# Patient Record
Sex: FEMALE | Race: WHITE | NOT HISPANIC OR LATINO | Employment: FULL TIME | URBAN - METROPOLITAN AREA
[De-identification: names, ages, dates, MRNs, and addresses within clinical notes are randomized per-mention and may not be internally consistent; named-entity substitution may affect disease eponyms.]

---

## 2023-03-04 ENCOUNTER — DOCUMENTATION (OUTPATIENT)
Dept: PRIMARY CARE | Facility: CLINIC | Age: 43
End: 2023-03-04
Payer: COMMERCIAL

## 2023-03-04 DIAGNOSIS — I10 PRIMARY HYPERTENSION: Primary | ICD-10-CM

## 2023-03-04 RX ORDER — LOSARTAN POTASSIUM 50 MG/1
TABLET ORAL
COMMUNITY
End: 2023-12-07 | Stop reason: ALTCHOICE

## 2023-03-04 RX ORDER — AMLODIPINE BESYLATE 5 MG/1
5 TABLET ORAL DAILY
Qty: 30 TABLET | Refills: 0 | Status: SHIPPED | OUTPATIENT
Start: 2023-03-04 | End: 2024-03-27

## 2023-03-04 NOTE — PROGRESS NOTES
Patient at work -- bp running high despite losartan 100 mg daily -- 180/110.   HA but no vision change, no cp/sob.   Add norvasc 5 mg daily and follow up either with PCP or re-establish with me in [place]. [facility]

## 2023-03-23 LAB
BASOPHILS (10*3/UL) IN BLOOD BY AUTOMATED COUNT: 0.07 X10E9/L (ref 0–0.1)
BASOPHILS/100 LEUKOCYTES IN BLOOD BY AUTOMATED COUNT: 0.7 % (ref 0–2)
EOSINOPHILS (10*3/UL) IN BLOOD BY AUTOMATED COUNT: 0.36 X10E9/L (ref 0–0.7)
EOSINOPHILS/100 LEUKOCYTES IN BLOOD BY AUTOMATED COUNT: 3.8 % (ref 0–6)
ERYTHROCYTE DISTRIBUTION WIDTH (RATIO) BY AUTOMATED COUNT: 12.9 % (ref 11.5–14.5)
ERYTHROCYTE MEAN CORPUSCULAR HEMOGLOBIN CONCENTRATION (G/DL) BY AUTOMATED: 34 G/DL (ref 32–36)
ERYTHROCYTE MEAN CORPUSCULAR VOLUME (FL) BY AUTOMATED COUNT: 95 FL (ref 80–100)
ERYTHROCYTES (10*6/UL) IN BLOOD BY AUTOMATED COUNT: 4.78 X10E12/L (ref 4–5.2)
HEMATOCRIT (%) IN BLOOD BY AUTOMATED COUNT: 45.3 % (ref 36–46)
HEMOGLOBIN (G/DL) IN BLOOD: 15.4 G/DL (ref 12–16)
IGA (MG/DL) IN SER/PLAS: 280 MG/DL (ref 70–400)
IGE (IU/L) IN SERUM OR PLASMA: 32 IU/ML (ref 0–214)
IGG (MG/DL) IN SER/PLAS: 1070 MG/DL (ref 700–1600)
IGM (MG/DL) IN SER/PLAS: 68 MG/DL (ref 40–230)
IMMATURE GRANULOCYTES/100 LEUKOCYTES IN BLOOD BY AUTOMATED COUNT: 0.3 % (ref 0–0.9)
LEUKOCYTES (10*3/UL) IN BLOOD BY AUTOMATED COUNT: 9.5 X10E9/L (ref 4.4–11.3)
LYMPHOCYTES (10*3/UL) IN BLOOD BY AUTOMATED COUNT: 3.03 X10E9/L (ref 1.2–4.8)
LYMPHOCYTES/100 LEUKOCYTES IN BLOOD BY AUTOMATED COUNT: 31.8 % (ref 13–44)
MONOCYTES (10*3/UL) IN BLOOD BY AUTOMATED COUNT: 0.89 X10E9/L (ref 0.1–1)
MONOCYTES/100 LEUKOCYTES IN BLOOD BY AUTOMATED COUNT: 9.3 % (ref 2–10)
NEUTROPHILS (10*3/UL) IN BLOOD BY AUTOMATED COUNT: 5.15 X10E9/L (ref 1.2–7.7)
NEUTROPHILS/100 LEUKOCYTES IN BLOOD BY AUTOMATED COUNT: 54.1 % (ref 40–80)
PLATELETS (10*3/UL) IN BLOOD AUTOMATED COUNT: 291 X10E9/L (ref 150–450)

## 2023-03-27 LAB
PNEUMO SEROTYPE INTERPRETATION: NORMAL
SEROTYPE 1, VIRC: 0.52 UG/ML
SEROTYPE 12F, VIRC: 0.29 UG/ML
SEROTYPE 14, VIRC: 34.69 UG/ML
SEROTYPE 18C(56), VIRC: 4.54 UG/ML
SEROTYPE 19F, VIRC: 16.78 UG/ML
SEROTYPE 23F, VIRC: 1.03 UG/ML
SEROTYPE 3, VIRC: 8.08 UG/ML
SEROTYPE 4, VIRC: 0.68 UG/ML
SEROTYPE 5, VIRC: 1.08 UG/ML
SEROTYPE 6B(26), VIRC: 3.07 UG/ML
SEROTYPE 7F(51), VIRC: 8.62 UG/ML
SEROTYPE 8, VIRC: 1.77 UG/ML
SEROTYPE 9N, VIRC: 0.19 UG/ML
SEROTYPE 9V(68), VIRC: 0.21 UG/ML

## 2023-03-30 LAB — MANNAN BINDING LECTIN: <40 NG/ML

## 2023-06-20 ENCOUNTER — APPOINTMENT (OUTPATIENT)
Dept: LAB | Facility: LAB | Age: 43
End: 2023-06-20
Payer: COMMERCIAL

## 2023-06-23 LAB
PNEUMO SEROTYPE INTERPRETATION: NORMAL
SEROTYPE 1, VIRC: 11.39 UG/ML
SEROTYPE 12F, VIRC: 4.56 UG/ML
SEROTYPE 14, VIRC: >52.09 UG/ML
SEROTYPE 18C(56), VIRC: 6.27 UG/ML
SEROTYPE 19F, VIRC: 20.7 UG/ML
SEROTYPE 23F, VIRC: 3.52 UG/ML
SEROTYPE 3, VIRC: >19.53 UG/ML
SEROTYPE 4, VIRC: 2.86 UG/ML
SEROTYPE 5, VIRC: 4.56 UG/ML
SEROTYPE 6B(26), VIRC: 15.09 UG/ML
SEROTYPE 7F(51), VIRC: 15.89 UG/ML
SEROTYPE 8, VIRC: >22.85 UG/ML
SEROTYPE 9N, VIRC: 1.04 UG/ML
SEROTYPE 9V(68), VIRC: 1.9 UG/ML

## 2023-07-06 ENCOUNTER — HOSPITAL ENCOUNTER (OUTPATIENT)
Dept: DATA CONVERSION | Facility: HOSPITAL | Age: 43
End: 2023-07-06
Payer: COMMERCIAL

## 2023-07-06 DIAGNOSIS — M85.80 OTHER SPECIFIED DISORDERS OF BONE DENSITY AND STRUCTURE, UNSPECIFIED SITE: ICD-10-CM

## 2023-07-06 DIAGNOSIS — Z88.2 ALLERGY STATUS TO SULFONAMIDES: ICD-10-CM

## 2023-07-06 DIAGNOSIS — F17.200 NICOTINE DEPENDENCE, UNSPECIFIED, UNCOMPLICATED: ICD-10-CM

## 2023-07-06 DIAGNOSIS — G47.33 OBSTRUCTIVE SLEEP APNEA (ADULT) (PEDIATRIC): ICD-10-CM

## 2023-07-06 DIAGNOSIS — K21.9 GASTRO-ESOPHAGEAL REFLUX DISEASE WITHOUT ESOPHAGITIS: ICD-10-CM

## 2023-07-06 DIAGNOSIS — J32.0 CHRONIC MAXILLARY SINUSITIS: ICD-10-CM

## 2023-07-06 DIAGNOSIS — E66.9 OBESITY, UNSPECIFIED: ICD-10-CM

## 2023-07-06 DIAGNOSIS — G43.909 MIGRAINE, UNSPECIFIED, NOT INTRACTABLE, WITHOUT STATUS MIGRAINOSUS: ICD-10-CM

## 2023-07-06 DIAGNOSIS — J32.2 CHRONIC ETHMOIDAL SINUSITIS: ICD-10-CM

## 2023-09-13 PROBLEM — E55.9 VITAMIN D DEFICIENCY: Status: ACTIVE | Noted: 2023-09-13

## 2023-09-13 PROBLEM — N92.0 MENORRHAGIA WITH REGULAR CYCLE: Status: ACTIVE | Noted: 2023-09-13

## 2023-09-13 PROBLEM — J30.9 ALLERGIC RHINITIS DUE TO ALLERGEN: Status: ACTIVE | Noted: 2023-09-13

## 2023-09-13 PROBLEM — J45.20 MILD INTERMITTENT ASTHMA (HHS-HCC): Status: ACTIVE | Noted: 2023-09-13

## 2023-09-13 PROBLEM — K21.9 GASTROESOPHAGEAL REFLUX DISEASE WITHOUT ESOPHAGITIS: Status: ACTIVE | Noted: 2023-09-13

## 2023-09-13 PROBLEM — J32.2 CHRONIC ETHMOIDAL SINUSITIS: Status: ACTIVE | Noted: 2023-09-13

## 2023-09-13 PROBLEM — H69.93 DYSFUNCTION OF BOTH EUSTACHIAN TUBES: Status: ACTIVE | Noted: 2023-09-13

## 2023-09-13 PROBLEM — J32.0 CHRONIC MAXILLARY SINUSITIS: Status: ACTIVE | Noted: 2023-09-13

## 2023-09-13 PROBLEM — J30.89 ALLERGIC RHINITIS DUE TO DUST MITE: Status: ACTIVE | Noted: 2023-09-13

## 2023-09-13 PROBLEM — N76.6 VULVAR ULCER: Status: ACTIVE | Noted: 2023-09-13

## 2023-09-13 PROBLEM — F41.1 GENERALIZED ANXIETY DISORDER: Status: ACTIVE | Noted: 2023-09-13

## 2023-09-13 PROBLEM — Q17.8 EUSTACHIAN TUBE ANOMALY: Status: ACTIVE | Noted: 2023-09-13

## 2023-09-13 PROBLEM — K76.0 FATTY INFILTRATION OF LIVER: Status: ACTIVE | Noted: 2023-09-13

## 2023-09-13 PROBLEM — R09.81 NASAL CONGESTION WITH RHINORRHEA: Status: ACTIVE | Noted: 2023-09-13

## 2023-09-13 PROBLEM — M26.629 TEMPOROMANDIBULAR JOINT PAIN DYSFUNCTION SYNDROME: Status: ACTIVE | Noted: 2023-09-13

## 2023-09-13 PROBLEM — B37.31 VAGINAL CANDIDIASIS: Status: ACTIVE | Noted: 2023-09-13

## 2023-09-13 PROBLEM — J30.1 NON-SEASONAL ALLERGIC RHINITIS DUE TO POLLEN: Status: ACTIVE | Noted: 2023-09-13

## 2023-09-13 PROBLEM — F51.01 PRIMARY INSOMNIA: Status: ACTIVE | Noted: 2023-09-13

## 2023-09-13 PROBLEM — N93.8 DUB (DYSFUNCTIONAL UTERINE BLEEDING): Status: ACTIVE | Noted: 2023-09-13

## 2023-09-13 PROBLEM — J32.4 CHRONIC PANSINUSITIS: Status: ACTIVE | Noted: 2023-09-13

## 2023-09-13 PROBLEM — J30.2 SEASONAL AND PERENNIAL ALLERGIC RHINOCONJUNCTIVITIS OF BOTH EYES: Status: ACTIVE | Noted: 2023-09-13

## 2023-09-13 PROBLEM — J34.3 HYPERTROPHY OF BOTH INFERIOR NASAL TURBINATES: Status: ACTIVE | Noted: 2023-09-13

## 2023-09-13 PROBLEM — L29.2 VULVAR ITCHING: Status: ACTIVE | Noted: 2023-09-13

## 2023-09-13 PROBLEM — D58.2 ELEVATED HEMOGLOBIN (CMS-HCC): Status: ACTIVE | Noted: 2023-09-13

## 2023-09-13 PROBLEM — N89.8 VAGINAL LESION: Status: ACTIVE | Noted: 2023-09-13

## 2023-09-13 PROBLEM — N63.20 BREAST MASS, LEFT: Status: ACTIVE | Noted: 2023-09-13

## 2023-09-13 PROBLEM — J30.9 ALLERGIC RHINOSINUSITIS: Status: ACTIVE | Noted: 2023-09-13

## 2023-09-13 PROBLEM — N89.8 VAGINAL DISCHARGE: Status: ACTIVE | Noted: 2023-09-13

## 2023-09-13 PROBLEM — G47.33 OSA (OBSTRUCTIVE SLEEP APNEA): Status: ACTIVE | Noted: 2023-09-13

## 2023-09-13 PROBLEM — J30.89 SEASONAL AND PERENNIAL ALLERGIC RHINOCONJUNCTIVITIS OF BOTH EYES: Status: ACTIVE | Noted: 2023-09-13

## 2023-09-13 PROBLEM — H10.13 SEASONAL AND PERENNIAL ALLERGIC RHINOCONJUNCTIVITIS OF BOTH EYES: Status: ACTIVE | Noted: 2023-09-13

## 2023-09-13 PROBLEM — B37.9 YEAST INFECTION: Status: ACTIVE | Noted: 2023-09-13

## 2023-09-13 PROBLEM — N90.89 VULVAR LESION: Status: ACTIVE | Noted: 2023-09-13

## 2023-09-13 PROBLEM — J34.89 NASAL CONGESTION WITH RHINORRHEA: Status: ACTIVE | Noted: 2023-09-13

## 2023-09-13 PROBLEM — D89.89 MANNOSE-BINDING LECTIN DEFICIENCY (MULTI): Status: ACTIVE | Noted: 2023-09-13

## 2023-09-13 PROBLEM — G43.009 MIGRAINE WITHOUT AURA, NOT REFRACTORY: Status: ACTIVE | Noted: 2023-09-13

## 2023-09-13 PROBLEM — R44.8 FACIAL PRESSURE: Status: ACTIVE | Noted: 2023-09-13

## 2023-09-13 RX ORDER — FAMOTIDINE 20 MG/1
1 TABLET, FILM COATED ORAL NIGHTLY PRN
COMMUNITY
Start: 2022-04-03 | End: 2024-02-05 | Stop reason: ALTCHOICE

## 2023-09-13 RX ORDER — ALBUTEROL SULFATE 90 UG/1
2 AEROSOL, METERED RESPIRATORY (INHALATION) 4 TIMES DAILY PRN
COMMUNITY
Start: 2021-05-25

## 2023-09-13 RX ORDER — ACETAMINOPHEN 500 MG
TABLET ORAL
COMMUNITY
End: 2023-09-13 | Stop reason: SDUPTHER

## 2023-09-13 RX ORDER — CALCIUM CARBONATE 1250 MG/5ML
SUSPENSION ORAL
COMMUNITY
End: 2023-12-07 | Stop reason: SDUPTHER

## 2023-09-13 RX ORDER — TRAZODONE HYDROCHLORIDE 50 MG/1
1 TABLET ORAL NIGHTLY PRN
COMMUNITY
Start: 2022-10-07 | End: 2023-12-07 | Stop reason: ALTCHOICE

## 2023-09-13 RX ORDER — CHOLECALCIFEROL (VITAMIN D3) 50 MCG
1 TABLET ORAL DAILY
COMMUNITY

## 2023-09-13 RX ORDER — VANCOMYCIN HCL 900 MCG/MG
POWDER (GRAM) MISCELLANEOUS DAILY
COMMUNITY
Start: 2022-09-08 | End: 2023-12-07 | Stop reason: ALTCHOICE

## 2023-09-13 RX ORDER — NADOLOL 40 MG/1
1 TABLET ORAL DAILY
COMMUNITY
Start: 2021-04-01 | End: 2023-12-07 | Stop reason: ALTCHOICE

## 2023-09-13 RX ORDER — RIZATRIPTAN BENZOATE 10 MG/1
1 TABLET, ORALLY DISINTEGRATING ORAL DAILY
COMMUNITY
Start: 2022-10-07 | End: 2023-12-07 | Stop reason: SDUPTHER

## 2023-09-13 RX ORDER — CYCLOBENZAPRINE HCL 10 MG
1 TABLET ORAL NIGHTLY
COMMUNITY
End: 2024-03-14 | Stop reason: ALTCHOICE

## 2023-09-13 RX ORDER — CHOLECALCIFEROL (VITAMIN D3) 125 MCG
CAPSULE ORAL
COMMUNITY
End: 2023-12-07 | Stop reason: ALTCHOICE

## 2023-09-13 RX ORDER — ZINC GLUCONATE 50 MG
1 TABLET ORAL DAILY
COMMUNITY

## 2023-09-13 RX ORDER — DOXYCYCLINE HYCLATE 100 MG
1 TABLET ORAL 2 TIMES DAILY
COMMUNITY
Start: 2023-06-23 | End: 2023-12-07 | Stop reason: ALTCHOICE

## 2023-09-13 RX ORDER — ESOMEPRAZOLE MAGNESIUM 40 MG/1
1 CAPSULE, DELAYED RELEASE ORAL 2 TIMES DAILY
COMMUNITY
Start: 2023-04-12 | End: 2023-07-11 | Stop reason: WASHOUT

## 2023-09-13 RX ORDER — TRIAMCINOLONE ACETONIDE OINTMENT USP, 0.05% 0.5 MG/G
OINTMENT TOPICAL 2 TIMES DAILY
COMMUNITY
Start: 2023-05-10

## 2023-09-13 RX ORDER — SOD CHLOR,BICARB/SQUEEZ BOTTLE
PACKET, WITH RINSE DEVICE NASAL
COMMUNITY
Start: 2021-10-28

## 2023-09-13 RX ORDER — PRAMIPEXOLE DIHYDROCHLORIDE 0.25 MG/1
2 TABLET ORAL DAILY
COMMUNITY
Start: 2022-03-24 | End: 2023-12-07 | Stop reason: ALTCHOICE

## 2023-09-13 RX ORDER — MONTELUKAST SODIUM 10 MG/1
1 TABLET ORAL DAILY
COMMUNITY
Start: 2018-03-01

## 2023-09-13 RX ORDER — SUCRALFATE 1 G/1
1 TABLET ORAL 3 TIMES DAILY
COMMUNITY
End: 2023-12-07 | Stop reason: ALTCHOICE

## 2023-09-13 RX ORDER — LOSARTAN POTASSIUM 100 MG/1
1 TABLET ORAL DAILY
COMMUNITY

## 2023-09-13 RX ORDER — CALCIUM CARBONATE 500(1250)
1 TABLET ORAL
COMMUNITY

## 2023-09-13 RX ORDER — HYDROGEN PEROXIDE 3 %
1 SOLUTION, NON-ORAL MISCELLANEOUS 2 TIMES DAILY
COMMUNITY

## 2023-09-13 RX ORDER — RIMEGEPANT SULFATE 75 MG/75MG
1 TABLET, ORALLY DISINTEGRATING ORAL EVERY OTHER DAY
COMMUNITY
Start: 2022-10-07 | End: 2023-12-07 | Stop reason: SDUPTHER

## 2023-09-13 RX ORDER — CITALOPRAM 20 MG/1
0.5 TABLET, FILM COATED ORAL
COMMUNITY
Start: 2021-04-07 | End: 2023-12-07 | Stop reason: ALTCHOICE

## 2023-09-13 RX ORDER — BUDESONIDE 1 MG/2ML
INHALANT ORAL
COMMUNITY
Start: 2022-04-09

## 2023-09-13 RX ORDER — CITALOPRAM 10 MG/1
1 TABLET ORAL DAILY
COMMUNITY
End: 2023-12-07 | Stop reason: ALTCHOICE

## 2023-09-13 RX ORDER — DICYCLOMINE HYDROCHLORIDE 10 MG/1
1 CAPSULE ORAL 4 TIMES DAILY PRN
COMMUNITY
Start: 2023-06-05 | End: 2023-12-07 | Stop reason: ALTCHOICE

## 2023-09-13 RX ORDER — LEVOCETIRIZINE DIHYDROCHLORIDE 5 MG/1
5 TABLET, FILM COATED ORAL EVERY EVENING
COMMUNITY
End: 2023-12-07 | Stop reason: ALTCHOICE

## 2023-09-13 RX ORDER — ASCORBIC ACID 500 MG
500 TABLET ORAL
COMMUNITY

## 2023-09-13 RX ORDER — ELETRIPTAN HYDROBROMIDE 40 MG/1
1 TABLET, FILM COATED ORAL AS NEEDED
COMMUNITY
Start: 2021-04-05 | End: 2023-12-07 | Stop reason: ALTCHOICE

## 2023-09-13 RX ORDER — OMEPRAZOLE 40 MG/1
1 CAPSULE, DELAYED RELEASE ORAL DAILY
COMMUNITY
Start: 2013-12-02 | End: 2023-12-07 | Stop reason: ALTCHOICE

## 2023-09-13 RX ORDER — MELOXICAM 15 MG/1
1 TABLET ORAL DAILY PRN
COMMUNITY
Start: 2022-11-03 | End: 2023-12-07 | Stop reason: ALTCHOICE

## 2023-09-13 RX ORDER — PNV NO.95/FERROUS FUM/FOLIC AC 28MG-0.8MG
TABLET ORAL
COMMUNITY

## 2023-09-13 RX ORDER — HYDROCHLOROTHIAZIDE 12.5 MG/1
1 TABLET ORAL DAILY
COMMUNITY
Start: 2023-01-06

## 2023-09-13 RX ORDER — CYCLOBENZAPRINE HCL 5 MG
1 TABLET ORAL NIGHTLY
COMMUNITY
Start: 2023-02-09 | End: 2023-12-07 | Stop reason: ALTCHOICE

## 2023-09-13 RX ORDER — CLOBETASOL PROPIONATE 0.5 MG/G
OINTMENT TOPICAL DAILY
COMMUNITY
End: 2023-12-07 | Stop reason: ALTCHOICE

## 2023-09-13 RX ORDER — TRIAMCINOLONE ACETONIDE 55 UG/1
2 SPRAY, METERED NASAL DAILY
COMMUNITY

## 2023-09-13 RX ORDER — CETIRIZINE HYDROCHLORIDE 10 MG/1
1 TABLET ORAL DAILY
COMMUNITY

## 2023-10-02 ENCOUNTER — TELEPHONE (OUTPATIENT)
Dept: PRIMARY CARE | Facility: CLINIC | Age: 43
End: 2023-10-02
Payer: COMMERCIAL

## 2023-10-02 DIAGNOSIS — F41.1 GENERALIZED ANXIETY DISORDER: ICD-10-CM

## 2023-10-02 DIAGNOSIS — G43.009 MIGRAINE WITHOUT AURA, NOT INTRACTABLE, WITHOUT STATUS MIGRAINOSUS: ICD-10-CM

## 2023-10-02 RX ORDER — PROPRANOLOL HYDROCHLORIDE 60 MG/1
CAPSULE, EXTENDED RELEASE ORAL
Qty: 30 CAPSULE | Refills: 2 | Status: SHIPPED | OUTPATIENT
Start: 2023-10-02 | End: 2023-12-07 | Stop reason: SINTOL

## 2023-10-02 RX ORDER — SERTRALINE HYDROCHLORIDE 50 MG/1
50 TABLET, FILM COATED ORAL DAILY
Qty: 30 TABLET | Refills: 2 | Status: SHIPPED | OUTPATIENT
Start: 2023-10-02 | End: 2023-12-07 | Stop reason: DRUGHIGH

## 2023-10-02 NOTE — OP NOTE
PROCEDURE DETAILS    Preoperative Diagnosis:  Obstructive sleep apnea, adult, G47.33    Postoperative Diagnosis:  Obstructive Sleep Apnea  Surgeon: Lurdes Bedoya  Resident/Fellow/Other Assistant: None of these were associated with this case    Procedure:  1.  Drug induced sleep endoscopy    Anesthesia: No anesthesiologist associated with this case  Estimated Blood Loss: 0  Findings: Collapsable Airway        Operative Report:   Patient was taken to [facility] operating room 4 by the anesthesia service, and remained in supine position. Oxymetazoline was used to decongest nasal mucosa.  Patient was sedated with Propofol for the sleep endoscopy.      Once effective Propofol sedation was achieved, patient slept comfortably, though was a bit restless throughout the sedation            A flexible endoscope was passed via the nares to evaluate sites of dynamic airway collapse.      STRUCTURE    OBSTRXN       A-P       LATERAL       CONCENTRIC      COMMENTS   VELUM                2                                                     2                         CCC     OROPHARYNX     1                                 1                                    TONGUE BASE    2               2        EPIGLOTTIS      0            The most significant finding was complete concentric collapse of the airway. Instability along the lateral pharyngeal walls and complete tongue collapse.    Patient's lowest oxygen desaturation was: 96%.    BIS= Lowest 63 and high 60s throughout exam     Patient tolerated exam well.  Blood loss: None                        Attestation:   Note Completion:  Attending Attestation I performed the procedure without a resident         Electronic Signatures:  Lurdes Bedoya)  (Signed 06-Jul-2023 09:43)   Authored: Post-Operative Note, Chart Review, Note Completion      Last Updated: 06-Jul-2023 09:43 by Lurdes Bedoya)

## 2023-10-02 NOTE — TELEPHONE ENCOUNTER
Patient called wanting to know if she can drop off paperwork from her allergy Dr. Dr Berman to see if your nurses can start giving her her allergy shots as it is closer for her to come here than to go to [place] where her allergist is. Please give her a call back at [phone]. PL

## 2023-10-03 NOTE — TELEPHONE ENCOUNTER
Patient was seen in September and patient was advised to go to Allergist for shots, offered a new referral for a closer allergist but patient refused and will go to the one she has been seeing.

## 2023-10-30 ENCOUNTER — TELEPHONE (OUTPATIENT)
Dept: PRIMARY CARE | Facility: CLINIC | Age: 43
End: 2023-10-30
Payer: COMMERCIAL

## 2023-11-27 ENCOUNTER — LAB (OUTPATIENT)
Dept: LAB | Facility: LAB | Age: 43
End: 2023-11-27
Payer: COMMERCIAL

## 2023-11-27 DIAGNOSIS — R73.09 OTHER ABNORMAL GLUCOSE: ICD-10-CM

## 2023-11-27 DIAGNOSIS — F41.1 GENERALIZED ANXIETY DISORDER: ICD-10-CM

## 2023-11-27 DIAGNOSIS — I10 ESSENTIAL (PRIMARY) HYPERTENSION: Primary | ICD-10-CM

## 2023-11-27 LAB
ALBUMIN SERPL BCP-MCNC: 4.5 G/DL (ref 3.4–5)
ALP SERPL-CCNC: 73 U/L (ref 33–110)
ALT SERPL W P-5'-P-CCNC: 28 U/L (ref 7–45)
ANION GAP SERPL CALC-SCNC: 14 MMOL/L (ref 10–20)
AST SERPL W P-5'-P-CCNC: 23 U/L (ref 9–39)
BASOPHILS # BLD AUTO: 0.09 X10*3/UL (ref 0–0.1)
BASOPHILS NFR BLD AUTO: 0.8 %
BILIRUB SERPL-MCNC: 1.2 MG/DL (ref 0–1.2)
BUN SERPL-MCNC: 13 MG/DL (ref 6–23)
CALCIUM SERPL-MCNC: 9.4 MG/DL (ref 8.6–10.3)
CHLORIDE SERPL-SCNC: 107 MMOL/L (ref 98–107)
CHOLEST SERPL-MCNC: 143 MG/DL (ref 0–199)
CHOLESTEROL/HDL RATIO: 4.6
CO2 SERPL-SCNC: 24 MMOL/L (ref 21–32)
CREAT SERPL-MCNC: 1.17 MG/DL (ref 0.5–1.05)
EOSINOPHIL # BLD AUTO: 0.18 X10*3/UL (ref 0–0.7)
EOSINOPHIL NFR BLD AUTO: 1.6 %
ERYTHROCYTE [DISTWIDTH] IN BLOOD BY AUTOMATED COUNT: 13.1 % (ref 11.5–14.5)
EST. AVERAGE GLUCOSE BLD GHB EST-MCNC: 114 MG/DL
GFR SERPL CREATININE-BSD FRML MDRD: 59 ML/MIN/1.73M*2
GLUCOSE SERPL-MCNC: 88 MG/DL (ref 74–99)
HBA1C MFR BLD: 5.6 %
HCT VFR BLD AUTO: 48.9 % (ref 36–46)
HDLC SERPL-MCNC: 31.3 MG/DL
HGB BLD-MCNC: 15.5 G/DL (ref 12–16)
IMM GRANULOCYTES # BLD AUTO: 0.03 X10*3/UL (ref 0–0.7)
IMM GRANULOCYTES NFR BLD AUTO: 0.3 % (ref 0–0.9)
LDLC SERPL CALC-MCNC: 52 MG/DL
LYMPHOCYTES # BLD AUTO: 2.97 X10*3/UL (ref 1.2–4.8)
LYMPHOCYTES NFR BLD AUTO: 26.9 %
MCH RBC QN AUTO: 31 PG (ref 26–34)
MCHC RBC AUTO-ENTMCNC: 31.7 G/DL (ref 32–36)
MCV RBC AUTO: 98 FL (ref 80–100)
MONOCYTES # BLD AUTO: 0.88 X10*3/UL (ref 0.1–1)
MONOCYTES NFR BLD AUTO: 8 %
NEUTROPHILS # BLD AUTO: 6.89 X10*3/UL (ref 1.2–7.7)
NEUTROPHILS NFR BLD AUTO: 62.4 %
NON HDL CHOLESTEROL: 112 MG/DL (ref 0–149)
NRBC BLD-RTO: 0 /100 WBCS (ref 0–0)
PLATELET # BLD AUTO: 341 X10*3/UL (ref 150–450)
POTASSIUM SERPL-SCNC: 4.3 MMOL/L (ref 3.5–5.3)
PROT SERPL-MCNC: 6.7 G/DL (ref 6.4–8.2)
RBC # BLD AUTO: 5 X10*6/UL (ref 4–5.2)
SODIUM SERPL-SCNC: 141 MMOL/L (ref 136–145)
TRIGL SERPL-MCNC: 301 MG/DL (ref 0–149)
TSH SERPL-ACNC: 2.71 MIU/L (ref 0.44–3.98)
VLDL: 60 MG/DL (ref 0–40)
WBC # BLD AUTO: 11 X10*3/UL (ref 4.4–11.3)

## 2023-11-27 PROCEDURE — 36415 COLL VENOUS BLD VENIPUNCTURE: CPT

## 2023-11-27 PROCEDURE — 80061 LIPID PANEL: CPT

## 2023-11-27 PROCEDURE — 83036 HEMOGLOBIN GLYCOSYLATED A1C: CPT

## 2023-11-27 PROCEDURE — 80053 COMPREHEN METABOLIC PANEL: CPT

## 2023-11-27 PROCEDURE — 84443 ASSAY THYROID STIM HORMONE: CPT

## 2023-11-27 PROCEDURE — 85025 COMPLETE CBC W/AUTO DIFF WBC: CPT

## 2023-12-06 ENCOUNTER — APPOINTMENT (OUTPATIENT)
Dept: OBSTETRICS AND GYNECOLOGY | Facility: CLINIC | Age: 43
End: 2023-12-06
Payer: COMMERCIAL

## 2023-12-07 ENCOUNTER — OFFICE VISIT (OUTPATIENT)
Dept: PRIMARY CARE | Facility: CLINIC | Age: 43
End: 2023-12-07
Payer: COMMERCIAL

## 2023-12-07 VITALS
WEIGHT: 221 LBS | HEART RATE: 89 BPM | HEIGHT: 67 IN | BODY MASS INDEX: 34.69 KG/M2 | OXYGEN SATURATION: 97 % | DIASTOLIC BLOOD PRESSURE: 86 MMHG | SYSTOLIC BLOOD PRESSURE: 124 MMHG

## 2023-12-07 DIAGNOSIS — I10 PRIMARY HYPERTENSION: ICD-10-CM

## 2023-12-07 DIAGNOSIS — K21.9 GASTROESOPHAGEAL REFLUX DISEASE WITHOUT ESOPHAGITIS: ICD-10-CM

## 2023-12-07 DIAGNOSIS — G43.009 MIGRAINE WITHOUT AURA, NOT REFRACTORY: Primary | ICD-10-CM

## 2023-12-07 DIAGNOSIS — F41.1 GENERALIZED ANXIETY DISORDER: ICD-10-CM

## 2023-12-07 PROBLEM — R44.8 FACIAL PRESSURE: Status: RESOLVED | Noted: 2023-09-13 | Resolved: 2023-12-07

## 2023-12-07 PROBLEM — D58.2 ELEVATED HEMOGLOBIN (CMS-HCC): Status: RESOLVED | Noted: 2023-09-13 | Resolved: 2023-12-07

## 2023-12-07 PROBLEM — J34.89 NASAL CONGESTION WITH RHINORRHEA: Status: RESOLVED | Noted: 2023-09-13 | Resolved: 2023-12-07

## 2023-12-07 PROBLEM — J30.1 NON-SEASONAL ALLERGIC RHINITIS DUE TO POLLEN: Status: RESOLVED | Noted: 2023-09-13 | Resolved: 2023-12-07

## 2023-12-07 PROBLEM — L29.2 VULVAR ITCHING: Status: RESOLVED | Noted: 2023-09-13 | Resolved: 2023-12-07

## 2023-12-07 PROBLEM — J32.2 CHRONIC ETHMOIDAL SINUSITIS: Status: RESOLVED | Noted: 2023-09-13 | Resolved: 2023-12-07

## 2023-12-07 PROBLEM — N92.0 MENORRHAGIA WITH REGULAR CYCLE: Status: RESOLVED | Noted: 2023-09-13 | Resolved: 2023-12-07

## 2023-12-07 PROBLEM — B37.9 YEAST INFECTION: Status: RESOLVED | Noted: 2023-09-13 | Resolved: 2023-12-07

## 2023-12-07 PROBLEM — J34.3 HYPERTROPHY OF BOTH INFERIOR NASAL TURBINATES: Status: RESOLVED | Noted: 2023-09-13 | Resolved: 2023-12-07

## 2023-12-07 PROBLEM — N89.8 VAGINAL LESION: Status: RESOLVED | Noted: 2023-09-13 | Resolved: 2023-12-07

## 2023-12-07 PROBLEM — N63.20 BREAST MASS, LEFT: Status: RESOLVED | Noted: 2023-09-13 | Resolved: 2023-12-07

## 2023-12-07 PROBLEM — R09.81 NASAL CONGESTION WITH RHINORRHEA: Status: RESOLVED | Noted: 2023-09-13 | Resolved: 2023-12-07

## 2023-12-07 PROBLEM — Q17.8 EUSTACHIAN TUBE ANOMALY: Status: RESOLVED | Noted: 2023-09-13 | Resolved: 2023-12-07

## 2023-12-07 PROBLEM — H69.93 DYSFUNCTION OF BOTH EUSTACHIAN TUBES: Status: RESOLVED | Noted: 2023-09-13 | Resolved: 2023-12-07

## 2023-12-07 PROBLEM — J32.0 CHRONIC MAXILLARY SINUSITIS: Status: RESOLVED | Noted: 2023-09-13 | Resolved: 2023-12-07

## 2023-12-07 PROBLEM — J30.9 ALLERGIC RHINITIS DUE TO ALLERGEN: Status: RESOLVED | Noted: 2023-09-13 | Resolved: 2023-12-07

## 2023-12-07 PROBLEM — N76.6 VULVAR ULCER: Status: RESOLVED | Noted: 2023-09-13 | Resolved: 2023-12-07

## 2023-12-07 PROBLEM — N89.8 VAGINAL DISCHARGE: Status: RESOLVED | Noted: 2023-09-13 | Resolved: 2023-12-07

## 2023-12-07 PROBLEM — J32.4 CHRONIC PANSINUSITIS: Status: RESOLVED | Noted: 2023-09-13 | Resolved: 2023-12-07

## 2023-12-07 PROBLEM — B37.31 VAGINAL CANDIDIASIS: Status: RESOLVED | Noted: 2023-09-13 | Resolved: 2023-12-07

## 2023-12-07 PROBLEM — J30.89 ALLERGIC RHINITIS DUE TO DUST MITE: Status: RESOLVED | Noted: 2023-09-13 | Resolved: 2023-12-07

## 2023-12-07 PROBLEM — N90.89 VULVAR LESION: Status: RESOLVED | Noted: 2023-09-13 | Resolved: 2023-12-07

## 2023-12-07 PROCEDURE — 3079F DIAST BP 80-89 MM HG: CPT | Performed by: FAMILY MEDICINE

## 2023-12-07 PROCEDURE — 3008F BODY MASS INDEX DOCD: CPT | Performed by: FAMILY MEDICINE

## 2023-12-07 PROCEDURE — 1036F TOBACCO NON-USER: CPT | Performed by: FAMILY MEDICINE

## 2023-12-07 PROCEDURE — 99214 OFFICE O/P EST MOD 30 MIN: CPT | Performed by: FAMILY MEDICINE

## 2023-12-07 PROCEDURE — 3074F SYST BP LT 130 MM HG: CPT | Performed by: FAMILY MEDICINE

## 2023-12-07 RX ORDER — RIZATRIPTAN BENZOATE 10 MG/1
10 TABLET, ORALLY DISINTEGRATING ORAL DAILY
Qty: 9 TABLET | Refills: 2 | Status: SHIPPED | OUTPATIENT
Start: 2023-12-07 | End: 2024-05-14

## 2023-12-07 RX ORDER — SERTRALINE HYDROCHLORIDE 50 MG/1
25 TABLET, FILM COATED ORAL DAILY
Qty: 30 TABLET | Refills: 2 | Status: SHIPPED | OUTPATIENT
Start: 2023-12-07 | End: 2023-12-07 | Stop reason: SDUPTHER

## 2023-12-07 RX ORDER — RIMEGEPANT SULFATE 75 MG/75MG
75 TABLET, ORALLY DISINTEGRATING ORAL EVERY OTHER DAY
Qty: 16 TABLET | Refills: 2 | Status: SHIPPED | OUTPATIENT
Start: 2023-12-07 | End: 2024-05-14

## 2023-12-07 RX ORDER — FAMOTIDINE 40 MG/1
40 TABLET, FILM COATED ORAL 2 TIMES DAILY
Qty: 60 TABLET | Refills: 5 | Status: SHIPPED | OUTPATIENT
Start: 2023-12-07 | End: 2024-05-13

## 2023-12-07 RX ORDER — SERTRALINE HYDROCHLORIDE 25 MG/1
25 TABLET, FILM COATED ORAL DAILY
Qty: 90 TABLET | Refills: 1 | Status: SHIPPED | OUTPATIENT
Start: 2023-12-07

## 2023-12-07 ASSESSMENT — PATIENT HEALTH QUESTIONNAIRE - PHQ9
2. FEELING DOWN, DEPRESSED OR HOPELESS: NOT AT ALL
1. LITTLE INTEREST OR PLEASURE IN DOING THINGS: NOT AT ALL
SUM OF ALL RESPONSES TO PHQ9 QUESTIONS 1 AND 2: 0

## 2023-12-07 ASSESSMENT — PAIN SCALES - GENERAL: PAINLEVEL: 0-NO PAIN

## 2023-12-07 NOTE — PATIENT INSTRUCTIONS
"-For anxiety - continue sertraline 25mg    -For migraine - use nurtec every other day - preventative dose.  Use maxalt as needed.      -Blood pressure well controlled.  Continue current regimen.      -Discussed intermittent fasting.  Read handout.  Recommend author Rosalba Jimenez - \"Delay, Don't Deny\".     Follow up in 3 months.     "

## 2023-12-07 NOTE — PROGRESS NOTES
"Subjective   Patient ID: Zoe Wallace is a 43 y.o. female who presents for 3 mo Anxiety.    Hypertension  -Patient is here for follow-up of elevated blood pressure.   -Blood pressure is well controlled.  -Cardiac symptoms: fatigue.   -Patient denies chest pain.   -Cardiologist: Dr. Blancas    Anxiety  -F/U: Pt has been more fatigued.  Pt was fatigued on sertraline 50mg - doing better on sertraline 25mg.  Pt was on propranolol - caused sedation  -Symptoms have been stable    -She denies current suicidal and homicidal ideation.    -Current Treatment: sertraline  -Counseling: none  -Previous treatment includes:     Headache:  -Type of headache: migraine without aura  -Symptoms:  unilateral, pounding headache.   -Frequency: 4-5 times a month  -Associated symptoms:  -Treatment: maxalt  -Specialist:  none  -Past Medications:        Propranolol - sedation             Review of Systems    Objective   /86 (BP Location: Right arm, Patient Position: Sitting, BP Cuff Size: Large adult)   Pulse 89   Ht 1.702 m (5' 7\")   Wt 100 kg (221 lb) Comment: boot on right leg  SpO2 97%   BMI 34.61 kg/m²     Physical Exam  Vitals reviewed.   Constitutional:       General: She is not in acute distress.  Cardiovascular:      Rate and Rhythm: Normal rate and regular rhythm.   Pulmonary:      Effort: Pulmonary effort is normal.      Breath sounds: No wheezing or rhonchi.   Musculoskeletal:      Right lower leg: No edema.      Left lower leg: No edema.      Comments: Boot on right foot   Lymphadenopathy:      Cervical: No cervical adenopathy.   Neurological:      Mental Status: She is alert.         Assessment/Plan   Diagnoses and all orders for this visit:  Migraine without aura, not refractory  -     Nurtec ODT 75 mg tablet,disintegrating; Take 1 tablet (75 mg) by mouth every other day. on the tongue and allow to dissolve with mild to moderate headache  -     rizatriptan MLT (Maxalt-MLT) 10 mg disintegrating tablet; Take 1 tablet " "(10 mg) by mouth once daily. with onset of moderate to severe headache may repeat dose in 2 hours if needed. no more than 2 doses in 24 hour period Orally  Generalized anxiety disorder  -     sertraline (Zoloft) 25 mg tablet; Take 1 tablet (25 mg) by mouth once daily.  Gastroesophageal reflux disease without esophagitis  -     famotidine (Pepcid) 40 mg tablet; Take 1 tablet (40 mg) by mouth 2 times a day.  Other orders  -     Follow Up In Primary Care - Established; Future    Patient Instructions   -For anxiety - continue sertraline 25mg    -For migraine - use nurtec every other day - preventative dose.  Use maxalt as needed.      -Blood pressure well controlled.  Continue current regimen.      -Discussed intermittent fasting.  Read handout.  Recommend author Rosalba Jimenez - \"Delay, Don't Deny\".     Follow up in 3 months.          "

## 2023-12-12 ENCOUNTER — TELEPHONE (OUTPATIENT)
Dept: PRIMARY CARE | Facility: CLINIC | Age: 43
End: 2023-12-12
Payer: COMMERCIAL

## 2023-12-13 ENCOUNTER — CLINICAL SUPPORT (OUTPATIENT)
Dept: OBSTETRICS AND GYNECOLOGY | Facility: CLINIC | Age: 43
End: 2023-12-13
Payer: COMMERCIAL

## 2023-12-13 VITALS — WEIGHT: 219 LBS | DIASTOLIC BLOOD PRESSURE: 80 MMHG | BODY MASS INDEX: 34.3 KG/M2 | SYSTOLIC BLOOD PRESSURE: 130 MMHG

## 2023-12-13 DIAGNOSIS — Z30.42 ENCOUNTER FOR DEPO-PROVERA CONTRACEPTION: ICD-10-CM

## 2023-12-13 DIAGNOSIS — L29.2 VULVAR ITCHING: ICD-10-CM

## 2023-12-13 PROCEDURE — 96372 THER/PROPH/DIAG INJ SC/IM: CPT | Performed by: NURSE PRACTITIONER

## 2023-12-13 RX ORDER — CLOBETASOL PROPIONATE 0.5 MG/G
OINTMENT TOPICAL DAILY
Qty: 30 G | Refills: 0 | Status: SHIPPED | OUTPATIENT
Start: 2023-12-13

## 2023-12-13 RX ORDER — MEDROXYPROGESTERONE ACETATE 150 MG/ML
150 INJECTION, SUSPENSION INTRAMUSCULAR ONCE
Status: COMPLETED | OUTPATIENT
Start: 2023-12-13 | End: 2023-12-13

## 2023-12-13 RX ADMIN — MEDROXYPROGESTERONE ACETATE 150 MG: 150 INJECTION, SUSPENSION INTRAMUSCULAR at 10:06

## 2023-12-13 NOTE — PROGRESS NOTES
Patient here for depo injection in left buttock    Next window 2/28/24 TO 3/14/24    LOT [id number]  EXP 03-31-27  NDC [id number]

## 2024-01-09 ENCOUNTER — TELEPHONE (OUTPATIENT)
Dept: PRIMARY CARE | Facility: CLINIC | Age: 44
End: 2024-01-09
Payer: COMMERCIAL

## 2024-01-09 DIAGNOSIS — B37.0 THRUSH: Primary | ICD-10-CM

## 2024-01-09 NOTE — TELEPHONE ENCOUNTER
Pt's mom is calling for a SDA/Advice/Meds  She has thrush,   Went to [facility] on Sunday 1/07/24 they gave her the liquid mouth rinse ( not really helping) OTC: Tylenol prn     Pharm: [facility]

## 2024-01-10 RX ORDER — FLUCONAZOLE 100 MG/1
100 TABLET ORAL DAILY
Qty: 5 TABLET | Refills: 0 | Status: SHIPPED | OUTPATIENT
Start: 2024-01-10 | End: 2024-01-15

## 2024-01-10 NOTE — TELEPHONE ENCOUNTER
Patient calling to follow up about her phone call yesterday. Patient states she has thrush. Please Advise TM      Patient with thrush is Zoe.  Original   Pt's mom is calling for a SDA/Advice/Meds  She has thrush,   Went to [facility] on Sunday 1/07/24 they gave her the liquid mouth rinse ( not really helping) OTC: Tylenol prn      [facility]: [facility] [place] [facility]

## 2024-02-05 ENCOUNTER — OFFICE VISIT (OUTPATIENT)
Dept: PRIMARY CARE | Facility: CLINIC | Age: 44
End: 2024-02-05
Payer: COMMERCIAL

## 2024-02-05 VITALS
BODY MASS INDEX: 33.67 KG/M2 | HEART RATE: 98 BPM | DIASTOLIC BLOOD PRESSURE: 92 MMHG | OXYGEN SATURATION: 98 % | WEIGHT: 215 LBS | SYSTOLIC BLOOD PRESSURE: 126 MMHG | TEMPERATURE: 98.4 F

## 2024-02-05 DIAGNOSIS — R82.998 LEUKOCYTES IN URINE: ICD-10-CM

## 2024-02-05 DIAGNOSIS — R39.15 URINARY URGENCY: ICD-10-CM

## 2024-02-05 DIAGNOSIS — J06.9 ACUTE UPPER RESPIRATORY INFECTION: Primary | ICD-10-CM

## 2024-02-05 LAB
POC APPEARANCE, URINE: CLEAR
POC BILIRUBIN, URINE: NEGATIVE
POC BLOOD, URINE: ABNORMAL
POC COLOR, URINE: YELLOW
POC GLUCOSE, URINE: NEGATIVE MG/DL
POC KETONES, URINE: ABNORMAL MG/DL
POC LEUKOCYTES, URINE: ABNORMAL
POC NITRITE,URINE: NEGATIVE
POC PH, URINE: 6 PH
POC PROTEIN, URINE: ABNORMAL MG/DL
POC SPECIFIC GRAVITY, URINE: >=1.03
POC UROBILINOGEN, URINE: 0.2 EU/DL

## 2024-02-05 PROCEDURE — 1036F TOBACCO NON-USER: CPT | Performed by: FAMILY MEDICINE

## 2024-02-05 PROCEDURE — 81003 URINALYSIS AUTO W/O SCOPE: CPT | Mod: QW | Performed by: FAMILY MEDICINE

## 2024-02-05 PROCEDURE — 99213 OFFICE O/P EST LOW 20 MIN: CPT | Performed by: FAMILY MEDICINE

## 2024-02-05 PROCEDURE — 87086 URINE CULTURE/COLONY COUNT: CPT | Mod: GEALAB | Performed by: FAMILY MEDICINE

## 2024-02-05 PROCEDURE — 3074F SYST BP LT 130 MM HG: CPT | Performed by: FAMILY MEDICINE

## 2024-02-05 PROCEDURE — 3008F BODY MASS INDEX DOCD: CPT | Performed by: FAMILY MEDICINE

## 2024-02-05 PROCEDURE — 3080F DIAST BP >= 90 MM HG: CPT | Performed by: FAMILY MEDICINE

## 2024-02-05 ASSESSMENT — ENCOUNTER SYMPTOMS
FLANK PAIN: 0
FREQUENCY: 1
SINUS PRESSURE: 1
COUGH: 0
NAUSEA: 0
SHORTNESS OF BREATH: 0
CHILLS: 0
SORE THROAT: 0
VOMITING: 0
SWOLLEN GLANDS: 0

## 2024-02-05 ASSESSMENT — PAIN SCALES - GENERAL: PAINLEVEL: 0-NO PAIN

## 2024-02-05 NOTE — PROGRESS NOTES
Subjective   Patient ID: Zoe Wallace is a 43 y.o. female who presents for UTI (Increased urgency, x 1 day).    UTI   This is a new problem. The current episode started today. The patient is experiencing no pain. Associated symptoms include frequency and urgency. Pertinent negatives include no chills, flank pain, nausea or vomiting. She has tried nothing for the symptoms.   Sinusitis  This is a new problem. The current episode started in the past 7 days. The problem is unchanged. There has been no fever. Associated symptoms include congestion, ear pain and sinus pressure. Pertinent negatives include no chills, coughing, shortness of breath, sore throat or swollen glands. Past treatments include nothing.        Review of Systems   Constitutional:  Negative for chills.   HENT:  Positive for congestion, ear pain and sinus pressure. Negative for sore throat.    Respiratory:  Negative for cough and shortness of breath.    Gastrointestinal:  Negative for nausea and vomiting.   Genitourinary:  Positive for frequency and urgency. Negative for flank pain.       Objective   BP (!) 126/92 (BP Location: Right arm, Patient Position: Sitting, BP Cuff Size: Large adult)   Pulse 98   Temp 36.9 °C (98.4 °F)   Wt 97.5 kg (215 lb)   SpO2 98%   BMI 33.67 kg/m²     Physical Exam  Vitals reviewed.   Constitutional:       General: She is not in acute distress.  HENT:      Ears:      Comments: Right Ear - healed perforation  Left Ear - TM flat.       Nose: Congestion and rhinorrhea present.      Mouth/Throat:      Mouth: Mucous membranes are dry.      Pharynx: Posterior oropharyngeal erythema present.   Cardiovascular:      Rate and Rhythm: Normal rate and regular rhythm.   Pulmonary:      Effort: Pulmonary effort is normal.      Breath sounds: No wheezing or rhonchi.   Abdominal:      General: Abdomen is flat.      Tenderness: There is no abdominal tenderness. There is no right CVA tenderness or left CVA tenderness.    Musculoskeletal:      Right lower leg: No edema.      Left lower leg: No edema.   Lymphadenopathy:      Cervical: No cervical adenopathy.   Neurological:      Mental Status: She is alert.         Assessment/Plan   Diagnoses and all orders for this visit:  Acute upper respiratory infection  Leukocytes in urine  -     POCT UA Automated manually resulted  -     Urine Culture  Urinary urgency    Patient Instructions   For sinuses - no evidence of ear infection.  Recommend gentle valsalva maneuver, nasal saline rinse, and coricidin HBP congestion.  If worsening, call back    For urinary symptoms - check culture and use azo while waiting.      Call if worsening

## 2024-02-05 NOTE — PATIENT INSTRUCTIONS
For sinuses - no evidence of ear infection.  Recommend gentle valsalva maneuver, nasal saline rinse, and coricidin HBP congestion.  If worsening, call back    For urinary symptoms - check culture and use azo while waiting.      Call if worsening

## 2024-02-08 DIAGNOSIS — N30.00 ACUTE CYSTITIS WITHOUT HEMATURIA: Primary | ICD-10-CM

## 2024-02-08 LAB — BACTERIA UR CULT: ABNORMAL

## 2024-02-08 RX ORDER — CEPHALEXIN 500 MG/1
500 CAPSULE ORAL 2 TIMES DAILY
Qty: 14 CAPSULE | Refills: 0 | Status: SHIPPED | OUTPATIENT
Start: 2024-02-08 | End: 2024-02-15

## 2024-02-28 ENCOUNTER — APPOINTMENT (OUTPATIENT)
Dept: OBSTETRICS AND GYNECOLOGY | Facility: CLINIC | Age: 44
End: 2024-02-28
Payer: COMMERCIAL

## 2024-03-14 ENCOUNTER — APPOINTMENT (OUTPATIENT)
Dept: PRIMARY CARE | Facility: CLINIC | Age: 44
End: 2024-03-14
Payer: COMMERCIAL

## 2024-03-14 ENCOUNTER — CLINICAL SUPPORT (OUTPATIENT)
Dept: OBSTETRICS AND GYNECOLOGY | Facility: CLINIC | Age: 44
End: 2024-03-14
Payer: COMMERCIAL

## 2024-03-14 VITALS — DIASTOLIC BLOOD PRESSURE: 82 MMHG | BODY MASS INDEX: 33.52 KG/M2 | SYSTOLIC BLOOD PRESSURE: 136 MMHG | WEIGHT: 214 LBS

## 2024-03-14 DIAGNOSIS — Z30.42 ENCOUNTER FOR DEPO-PROVERA CONTRACEPTION: ICD-10-CM

## 2024-03-14 PROCEDURE — 96372 THER/PROPH/DIAG INJ SC/IM: CPT | Performed by: OBSTETRICS & GYNECOLOGY

## 2024-03-14 RX ORDER — MEDROXYPROGESTERONE ACETATE 150 MG/ML
150 INJECTION, SUSPENSION INTRAMUSCULAR ONCE
Status: COMPLETED | OUTPATIENT
Start: 2024-03-14 | End: 2024-03-14

## 2024-03-14 RX ORDER — MEDROXYPROGESTERONE ACETATE 150 MG/ML
150 INJECTION, SUSPENSION INTRAMUSCULAR
COMMUNITY

## 2024-03-14 RX ADMIN — MEDROXYPROGESTERONE ACETATE 150 MG: 150 INJECTION, SUSPENSION INTRAMUSCULAR at 14:16

## 2024-03-14 NOTE — PROGRESS NOTES
Patient here for depo injection left buttock    Next window 5/30/24 TO 6/13/24    LOT [id number]  EXP 01/2026  NDC [id number]

## 2024-03-27 ENCOUNTER — OFFICE VISIT (OUTPATIENT)
Dept: PRIMARY CARE | Facility: CLINIC | Age: 44
End: 2024-03-27
Payer: COMMERCIAL

## 2024-03-27 VITALS
BODY MASS INDEX: 34.23 KG/M2 | WEIGHT: 213 LBS | OXYGEN SATURATION: 96 % | HEART RATE: 99 BPM | HEIGHT: 66 IN | DIASTOLIC BLOOD PRESSURE: 86 MMHG | SYSTOLIC BLOOD PRESSURE: 118 MMHG

## 2024-03-27 DIAGNOSIS — J45.20 MILD INTERMITTENT ASTHMA WITHOUT COMPLICATION (HHS-HCC): ICD-10-CM

## 2024-03-27 DIAGNOSIS — J34.89 SINUS PRESSURE: ICD-10-CM

## 2024-03-27 DIAGNOSIS — R73.09 ELEVATED GLUCOSE: ICD-10-CM

## 2024-03-27 DIAGNOSIS — G43.009 MIGRAINE WITHOUT AURA, NOT REFRACTORY: ICD-10-CM

## 2024-03-27 DIAGNOSIS — D89.89 MANNOSE-BINDING LECTIN DEFICIENCY (MULTI): ICD-10-CM

## 2024-03-27 DIAGNOSIS — I10 PRIMARY HYPERTENSION: Primary | ICD-10-CM

## 2024-03-27 DIAGNOSIS — G47.33 OSA (OBSTRUCTIVE SLEEP APNEA): ICD-10-CM

## 2024-03-27 DIAGNOSIS — E55.9 VITAMIN D DEFICIENCY: ICD-10-CM

## 2024-03-27 PROCEDURE — 3008F BODY MASS INDEX DOCD: CPT | Performed by: FAMILY MEDICINE

## 2024-03-27 PROCEDURE — 3074F SYST BP LT 130 MM HG: CPT | Performed by: FAMILY MEDICINE

## 2024-03-27 PROCEDURE — 1036F TOBACCO NON-USER: CPT | Performed by: FAMILY MEDICINE

## 2024-03-27 PROCEDURE — 3079F DIAST BP 80-89 MM HG: CPT | Performed by: FAMILY MEDICINE

## 2024-03-27 PROCEDURE — 99214 OFFICE O/P EST MOD 30 MIN: CPT | Performed by: FAMILY MEDICINE

## 2024-03-27 RX ORDER — FLUTICASONE PROPIONATE 50 MCG
1 SPRAY, SUSPENSION (ML) NASAL DAILY
Qty: 16 G | Refills: 5 | Status: SHIPPED | OUTPATIENT
Start: 2024-03-27 | End: 2024-05-03 | Stop reason: SDUPTHER

## 2024-03-27 ASSESSMENT — PATIENT HEALTH QUESTIONNAIRE - PHQ9
2. FEELING DOWN, DEPRESSED OR HOPELESS: NOT AT ALL
SUM OF ALL RESPONSES TO PHQ9 QUESTIONS 1 AND 2: 0
1. LITTLE INTEREST OR PLEASURE IN DOING THINGS: NOT AT ALL

## 2024-03-27 ASSESSMENT — PAIN SCALES - GENERAL: PAINLEVEL: 0-NO PAIN

## 2024-03-27 NOTE — PROGRESS NOTES
"Subjective   Patient ID: Zoe Wallace is a 43 y.o. female who presents for migraines.    Hypertension  -Patient is here for follow-up of elevated blood pressure.   -Blood pressure is well controlled.  -Cardiac symptoms: none  -Patient denies chest pain, SOB  -Cardiologist: Dr. Blancas - follow up next week.     Anxiety  -F/U: Doing well on sertraline.  No new issues.   -Symptoms have been stable    -She denies current suicidal and homicidal ideation.    -Current Treatment: sertraline  -Counseling: none  -Previous treatment includes:     Headache:  -Type of headache: migraine without aura  -Symptoms:  unilateral, pounding headache. Recently - pt has more sinus headaches.  Different than migraines.  Present for about 5 days.  Similar to previous sinus infections.  Using coricidin congestion.  Pt also has allergies.    -Frequency: 4-5 times a month  -Associated symptoms:  -Treatment: maxalt  -Specialist:  none  -Past Medications:        Propranolol - sedation    Obstructed Sleep Apnea  -F/U: Pt has NIKOLAI - pt is getting fitted for oral appliance.   -Compliance: in process of getting appliance   -Response:   -Specialist:     Asthma:  -F/U: doing well.  Upper respiratory issues.    -Medications Using: albuterol, singulair  -Past Medications:  -Frequency of Symptoms: < 2 times a week  -Rescue Inhaler Use: < 2 times a week  -Nocturnal Symptoms: none  -Prednisone Use: none  -Specialist:             Review of Systems    Objective   /86 (BP Location: Right arm, Patient Position: Sitting, BP Cuff Size: Large adult)   Pulse 99   Ht 1.676 m (5' 6\")   Wt 96.6 kg (213 lb)   SpO2 96%   BMI 34.38 kg/m²     Physical Exam  Vitals reviewed.   Constitutional:       General: She is not in acute distress.  Cardiovascular:      Rate and Rhythm: Normal rate and regular rhythm.   Pulmonary:      Effort: Pulmonary effort is normal.      Breath sounds: No wheezing or rhonchi.   Musculoskeletal:      Right lower leg: No edema.      " Left lower leg: No edema.   Lymphadenopathy:      Cervical: No cervical adenopathy.   Neurological:      Mental Status: She is alert.         Assessment/Plan   Diagnoses and all orders for this visit:  Primary hypertension  Migraine without aura, not refractory  NIKOLAI (obstructive sleep apnea)  Sinus pressure  Mannose-binding lectin deficiency  Mild intermittent asthma without complication    Patient Instructions   For Blood pressure - well controlled on amlodipine, losartan and hydrochlorothiazide.     For asthma/allegies/Mannose binding deficieny - asthma controlled.  For allegies - use flonase during spring.  If sinuses worsening call for antibiotic.  We can add on diflucan for yeast infections.     For sleep apnea - use oral appliance.     Blood work prior to next visit.  Follow up in 6 months for physical.

## 2024-03-27 NOTE — PATIENT INSTRUCTIONS
For Blood pressure - well controlled on amlodipine, losartan and hydrochlorothiazide.     For asthma/allegies/Mannose binding deficieny - asthma controlled.  For allegies - use flonase during spring.  If sinuses worsening call for antibiotic.  We can add on diflucan for yeast infections.     For sleep apnea - use oral appliance.     Blood work prior to next visit.  Follow up in 6 months for physical.

## 2024-04-12 ENCOUNTER — TELEPHONE (OUTPATIENT)
Dept: PRIMARY CARE | Facility: CLINIC | Age: 44
End: 2024-04-12
Payer: COMMERCIAL

## 2024-04-12 DIAGNOSIS — J01.90 ACUTE RHINOSINUSITIS: Primary | ICD-10-CM

## 2024-04-12 RX ORDER — CLARITHROMYCIN 500 MG/1
500 TABLET, FILM COATED ORAL 2 TIMES DAILY
Qty: 28 TABLET | Refills: 0 | Status: SHIPPED | OUTPATIENT
Start: 2024-04-12 | End: 2024-04-26

## 2024-04-12 RX ORDER — FLUCONAZOLE 150 MG/1
150 TABLET ORAL ONCE
Qty: 1 TABLET | Refills: 0 | Status: SHIPPED | OUTPATIENT
Start: 2024-04-12 | End: 2024-04-12

## 2024-04-12 NOTE — TELEPHONE ENCOUNTER
Patient saw Dr Marcus two weeks ago, and said she needs an RX for sinus pressure and congestion.    [facility] [place]

## 2024-04-16 ENCOUNTER — OFFICE VISIT (OUTPATIENT)
Dept: OTOLARYNGOLOGY | Facility: CLINIC | Age: 44
End: 2024-04-16
Payer: COMMERCIAL

## 2024-04-16 VITALS — BODY MASS INDEX: 34.55 KG/M2 | WEIGHT: 215 LBS | HEIGHT: 66 IN

## 2024-04-16 DIAGNOSIS — J30.9 ALLERGIC RHINITIS, UNSPECIFIED SEASONALITY, UNSPECIFIED TRIGGER: ICD-10-CM

## 2024-04-16 DIAGNOSIS — R44.8 FACIAL PRESSURE: Primary | ICD-10-CM

## 2024-04-16 DIAGNOSIS — R43.8 DECREASED SENSE OF SMELL: ICD-10-CM

## 2024-04-16 DIAGNOSIS — R09.81 NASAL CONGESTION: ICD-10-CM

## 2024-04-16 PROCEDURE — 3008F BODY MASS INDEX DOCD: CPT | Performed by: OTOLARYNGOLOGY

## 2024-04-16 PROCEDURE — 31231 NASAL ENDOSCOPY DX: CPT | Performed by: OTOLARYNGOLOGY

## 2024-04-16 PROCEDURE — 99213 OFFICE O/P EST LOW 20 MIN: CPT | Performed by: OTOLARYNGOLOGY

## 2024-04-16 RX ORDER — SUCRALFATE 1 G/1
1 TABLET ORAL EVERY 6 HOURS
COMMUNITY

## 2024-04-16 RX ORDER — PREDNISONE 10 MG/1
TABLET ORAL
Qty: 19 TABLET | Refills: 0 | Status: SHIPPED | OUTPATIENT
Start: 2024-04-16 | End: 2024-05-11 | Stop reason: WASHOUT

## 2024-04-16 RX ORDER — OMEPRAZOLE 40 MG/1
40 CAPSULE, DELAYED RELEASE ORAL
COMMUNITY
End: 2024-05-11 | Stop reason: WASHOUT

## 2024-04-16 RX ORDER — SOD SULF/POT CHLORIDE/MAG SULF 1.479 G
TABLET ORAL
COMMUNITY
Start: 2024-03-12

## 2024-04-16 ASSESSMENT — PATIENT HEALTH QUESTIONNAIRE - PHQ9
SUM OF ALL RESPONSES TO PHQ9 QUESTIONS 1 AND 2: 0
2. FEELING DOWN, DEPRESSED OR HOPELESS: NOT AT ALL
1. LITTLE INTEREST OR PLEASURE IN DOING THINGS: NOT AT ALL

## 2024-04-16 NOTE — PROGRESS NOTES
Chief Complaint:  1. Chronic sinusitis with history of sinus surgery in 2012; revision 11/21/19  2. Nasal airway obstruction s/p septoplasty and inferior turbinate resections 11/21/19   3. Facial pain and pressure; headaches; migraines  4. Throat clearing, coughing  5. Allergic rhinitis on immunotherapy started 2022  6. Reflux on omeprazole  7. Mannose lectin binding deficiency    History Of Present Illness:    Zoe Wallace presents since last being seen 11/15/22.     She presents today with an exacerbation since March of this year.  She has been having facial pain and pressure in her forehead and beneath her eyes that has been persistent as well as worsening nasal airway obstruction..    She discontinued allergy shots in January 2024 but plans to resume this.      She took clarithromycin and developed a rash on her lower abdomen and stopped this therapy.  She has had thrush previously while taking doxycycline.     Main Symptoms:  Patient does not have anterior nasal drainage.     Patient does not have  posterior nasal drainage.    Patient has nasal airway obstruction.   Patient has had facial pain continuously below the eyes and in forehead since the end of March 2024.    Patient has had facial pressure continuously below the eyes and in forehead since the end of March 2024.    Patient has decreased sense of smell. Decreased 90 % of normal.   Associated Symptoms:   Patient has had sinus headaches continuously below the eyes and in forehead since the end of March 2024.    Patient does not have throat clearing.    Patient has mild coughing.    Patient has mild dysphonia.   Patient does not have nasal bleeding.     Medications currently on for sinonasal symptoms:  OTC saline spray, Nasacort 2 puffs each side BID, Zyrtec, OTC decongestant (sudafed derivative)  Medications tried in the past for sinonasal symptoms:  Clarithromycin (discontinued due to rash in lower abdomen)    Other Pertinent Medical Conditions:    Patient has history of allergy testing. Patient has history of IT that was stopped in January of 2024 due to distance from clinic. She plans to visit an allergist and is interested in resuming shots.    Active Problems:  Patient Active Problem List   Diagnosis    HTN (hypertension)    Allergic rhinosinusitis    DUB (dysfunctional uterine bleeding)    Fatty infiltration of liver    Gastroesophageal reflux disease without esophagitis    Generalized anxiety disorder    Mannose-binding lectin deficiency (Multi)    Migraine without aura, not refractory    Mild intermittent asthma ([facility]-HCC)    NIKOLAI (obstructive sleep apnea)    Primary insomnia    Seasonal and perennial allergic rhinoconjunctivitis of both eyes    Temporomandibular joint pain dysfunction syndrome    Vitamin D deficiency        Past Medical History:  She has a past medical history of Acute upper respiratory infection, unspecified (08/05/2019), Chronic ethmoidal sinusitis (07/15/2019), Chronic maxillary sinusitis (06/14/2019), Encounter for screening for malignant neoplasm of vagina, Gastro-esophageal reflux disease without esophagitis (03/27/2019), Lesion of ulnar nerve, unspecified upper limb (02/11/2014), Other conditions influencing health status, Otitis media, unspecified, right ear (03/14/2019), Personal history of other (healed) physical injury and trauma (06/19/2014), Personal history of other diseases of the digestive system (07/16/2014), Personal history of other diseases of the nervous system and sense organs (04/25/2014), Personal history of other diseases of the respiratory system (04/25/2014), Personal history of other diseases of the respiratory system (06/04/2013), Personal history of other diseases of the respiratory system (09/29/2020), Personal history of other diseases of the respiratory system (07/14/2015), Personal history of other endocrine, nutritional and metabolic disease (07/16/2014), Personal history of other infectious and  parasitic diseases (12/10/2018), Personal history of other specified conditions (12/14/2021), Personal history of other specified conditions, Personal history of other specified conditions, Unspecified injury of unspecified ankle, initial encounter (06/19/2014), Unspecified otitis externa, right ear (03/14/2019), and Unspecified otitis externa, unspecified ear (03/18/2019).    Surgical History:  She has a past surgical history that includes Sinus surgery (09/13/2013); Other surgical history (04/15/2014); and Toe Surgery (Right, 12/2023).     Family History:  Family History   Problem Relation Name Age of Onset    Hypertension Mother      Hypertension Father      Asthma Father      Allergies Father         Social History:  She reports that she has quit smoking. Her smoking use included cigarettes. She has never used smokeless tobacco. She reports that she does not currently use alcohol. She reports that she does not currently use drugs.     Allergies:  Amoxicillin-pot clavulanate, Cat dander, Doxycycline, House dust, House dust mite, Mold, Moxifloxacin, Pollen extracts, Ragweed, Sulfamethoxazole-trimethoprim, and Tree and shrub pollen    Current Meds:    Current Outpatient Medications:     albuterol 90 mcg/actuation inhaler, Inhale 2 puffs 4 times a day as needed., Disp: , Rfl:     amLODIPine (Norvasc) 5 mg tablet, Take 1 tablet (5 mg) by mouth once daily., Disp: 30 tablet, Rfl: 0    ascorbic acid (Vitamin C) 500 mg tablet, Take 1 tablet (500 mg) by mouth., Disp: , Rfl:     budesonide (Pulmicort) 1 mg/2 mL nebulizer solution, , Disp: , Rfl:     calcium carbonate (Oscal) 500 mg calcium (1,250 mg) tablet, Take 1 tablet (1,250 mg) by mouth 2 times a day with meals., Disp: , Rfl:     cetirizine (ZyrTEC) 10 mg tablet, Take 1 tablet (10 mg) by mouth once daily. FOR 90 DAYS for 90, Disp: , Rfl:     cholecalciferol (Vitamin D-3) 50 MCG (2000 UT) tablet, Take 1 tablet (2,000 Units) by mouth once daily., Disp: , Rfl:      clarithromycin (Biaxin) 500 mg tablet, Take 1 tablet (500 mg) by mouth 2 times a day for 14 days., Disp: 28 tablet, Rfl: 0    clobetasol (Temovate) 0.05 % ointment, Apply topically once daily.  APPLY SPARINGLY TO AFFECTED AREA, Disp: 30 g, Rfl: 0    cyanocobalamin (Vitamin B-12) 100 mcg tablet, Take by mouth., Disp: , Rfl:     esomeprazole (NexIUM) 20 mg DR capsule, Take 1 capsule (20 mg) by mouth 2 times a day., Disp: , Rfl:     famotidine (Pepcid) 40 mg tablet, Take 1 tablet (40 mg) by mouth 2 times a day., Disp: 60 tablet, Rfl: 5    fluticasone (Flonase) 50 mcg/actuation nasal spray, Administer 1 spray into each nostril once daily. Shake gently. Before first use, prime pump. After use, clean tip and replace cap., Disp: 16 g, Rfl: 5    hydroCHLOROthiazide (HYDRODiuril) 12.5 mg tablet, Take 1 tablet (12.5 mg) by mouth once daily. For 90 days, Disp: , Rfl:     losartan (Cozaar) 100 mg tablet, Take 1 tablet (100 mg) by mouth once daily., Disp: , Rfl:     magnesium carb,citrate,oxide (MAGNESIUM COMPLEX ORAL), Take 500 mg by mouth once daily., Disp: , Rfl:     medroxyPROGESTERone (Depo-Provera) 150 mg/mL injection, Inject 1 mL (150 mg) into the muscle every 12 weeks., Disp: , Rfl:     montelukast (Singulair) 10 mg tablet, Take 1 tablet (10 mg) by mouth once daily., Disp: , Rfl:     Nurtec ODT 75 mg tablet,disintegrating, Take 1 tablet (75 mg) by mouth every other day. on the tongue and allow to dissolve with mild to moderate headache, Disp: 16 tablet, Rfl: 2    rizatriptan MLT (Maxalt-MLT) 10 mg disintegrating tablet, Take 1 tablet (10 mg) by mouth once daily. with onset of moderate to severe headache may repeat dose in 2 hours if needed. no more than 2 doses in 24 hour period Orally, Disp: 9 tablet, Rfl: 2    sertraline (Zoloft) 25 mg tablet, Take 1 tablet (25 mg) by mouth once daily., Disp: 90 tablet, Rfl: 1    sod chloride-sodium bicarb (Nasal Wash) nasal rinse, , Disp: , Rfl:     triamcinolone (Nasacort) 55 mcg  nasal inhaler, Administer 2 sprays into affected nostril(s) once daily., Disp: , Rfl:     triamcinolone acetonide 0.05 % ointment, Apply topically 2 times a day.  APPLY TO AFFECTED AREA EXTERNALLY, Disp: , Rfl:     zinc gluconate 50 mg tablet, Take 1 tablet (50 mg) by mouth once daily., Disp: , Rfl:     Vitals:  Visit Vitals  OB Status Injection   Smoking Status Former      Physical Exam:  Nose: On external exam there are neither lesions nor asymmetry of the nasal tip/dorsum. On anterior rhinoscopy, visualization posteriorly is limited on anterior examination. For this reason, to adequately evaluate posteriorly for masses, source of epistaxis, polypoid disease, debridement, and/or signs of infections, nasal endoscopy is indicated.  (Please see procedure below.)    SINONASAL ENDOSCOPY (CPT 68854): To better evaluate the patient's symptoms, sinonasal endoscopy is indicated.  After discussion of risks and benefits, and topical decongestion and anesthesia,an endoscope was used to perform nasal endoscopy on each side.  A time out identifying the patient, the procedure, the location of the procedure and any concerns was performed prior to beginning the procedure.    Findings:  Examination of each nasal cavity revealed a patent maxillary, ethmoid, sphenoid, and frontal without mucosal edema or purulence.  There was no pus or polyps at either middle meatus or sphenoethmoid recess.    Provider Impressions:  1. Chronic sinusitis with history of sinus surgery in 2012; revision 11/21/19  2. Nasal airway obstruction s/p septoplasty and inferior turbinate resections 11/21/19   3. Facial pain and pressure; headaches; migraines  4. Mild cough, dysphonia  5. Allergic rhinitis on immunotherapy started 2022 discontinued January 2024  6. Reflux on omeprazole  7. Mannose lectin binding deficiency  8. Decreased sense of smell    Discussion:  Zoe Wallace did not have evidence of infection today.  We discussed additional options both  oral and topical and she was comfortable moving forward with prednisone and this was prescribed today.  We discussed common side effects and I recommended discontinuation for any issues.  I am suspicious that some of her symptoms could be driven by her underlying allergies and she plans to follow-up with an allergist closer to her home and resume immunotherapy.    If she completes the prednisone and her symptoms persist, I think trials of alternative intranasal medical therapy would make sense for her.  She could consider a rinse based steroid, Xhance, and/or a topical antihistamine.    If she does well moving forward I recommended follow-up with me as needed.  She was amenable to this and all questions were answered.    Signature:  Scribe Attestation  By signing my name below, I, Emelia Montiel   attest that this documentation has been prepared under the direction and in the presence of Rene Hatfield MD.

## 2024-04-24 ENCOUNTER — TELEPHONE (OUTPATIENT)
Dept: PRIMARY CARE | Facility: CLINIC | Age: 44
End: 2024-04-24
Payer: COMMERCIAL

## 2024-04-24 DIAGNOSIS — G43.009 MIGRAINE WITHOUT AURA, NOT REFRACTORY: Primary | ICD-10-CM

## 2024-04-24 NOTE — TELEPHONE ENCOUNTER
Pt said her headaches are getting worse and consistant should she see Dr Marcus again, or get a referral to Neuro?  [phone]

## 2024-05-03 DIAGNOSIS — J34.89 SINUS PRESSURE: ICD-10-CM

## 2024-05-03 RX ORDER — FLUTICASONE PROPIONATE 50 MCG
1 SPRAY, SUSPENSION (ML) NASAL DAILY
Qty: 48 G | Refills: 1 | Status: SHIPPED | OUTPATIENT
Start: 2024-05-03 | End: 2025-05-03

## 2024-05-10 ENCOUNTER — OFFICE VISIT (OUTPATIENT)
Dept: NEUROLOGY | Facility: HOSPITAL | Age: 44
End: 2024-05-10
Payer: COMMERCIAL

## 2024-05-10 VITALS
SYSTOLIC BLOOD PRESSURE: 135 MMHG | BODY MASS INDEX: 34.55 KG/M2 | DIASTOLIC BLOOD PRESSURE: 87 MMHG | WEIGHT: 215 LBS | RESPIRATION RATE: 17 BRPM | HEART RATE: 89 BPM | HEIGHT: 66 IN

## 2024-05-10 DIAGNOSIS — G43.009 MIGRAINE WITHOUT AURA, NOT REFRACTORY: ICD-10-CM

## 2024-05-10 PROCEDURE — 3079F DIAST BP 80-89 MM HG: CPT | Performed by: PSYCHIATRY & NEUROLOGY

## 2024-05-10 PROCEDURE — 3008F BODY MASS INDEX DOCD: CPT | Performed by: PSYCHIATRY & NEUROLOGY

## 2024-05-10 PROCEDURE — 99214 OFFICE O/P EST MOD 30 MIN: CPT | Mod: GC | Performed by: PSYCHIATRY & NEUROLOGY

## 2024-05-10 PROCEDURE — 99214 OFFICE O/P EST MOD 30 MIN: CPT | Performed by: PSYCHIATRY & NEUROLOGY

## 2024-05-10 PROCEDURE — 3075F SYST BP GE 130 - 139MM HG: CPT | Performed by: PSYCHIATRY & NEUROLOGY

## 2024-05-10 RX ORDER — TOPIRAMATE 25 MG/1
25 TABLET ORAL DAILY
Qty: 30 TABLET | Refills: 2 | Status: SHIPPED | OUTPATIENT
Start: 2024-05-10 | End: 2024-06-11

## 2024-05-10 RX ORDER — ONDANSETRON 4 MG/1
8 TABLET, FILM COATED ORAL EVERY 8 HOURS PRN
Qty: 30 TABLET | Refills: 1 | Status: SHIPPED | OUTPATIENT
Start: 2024-05-10 | End: 2024-06-09

## 2024-05-10 RX ORDER — PROMETHAZINE HYDROCHLORIDE 25 MG/1
25 TABLET ORAL EVERY 8 HOURS PRN
Qty: 90 TABLET | Refills: 1 | Status: SHIPPED | OUTPATIENT
Start: 2024-05-10 | End: 2024-07-09

## 2024-05-10 ASSESSMENT — PAIN SCALES - GENERAL: PAINLEVEL: 8

## 2024-05-10 NOTE — PATIENT INSTRUCTIONS
We will start a preventative medication called topiramate.  We want to start at a low dose and follow up in 1 month.     MRI scan was ordered -- please call [phone] (central scheduling for radiology) and schedule it. When asked, you can advise them to schedule at a facility near your home.         General instructions for migraines:    Keep a calendar of your migraines.    Wear hair loose.      Exercise regularly, get plenty of fresh air and regular, sleep.    Preventative medication should be taken every day regardless of whether or not, you have a headache.  It is to prevent headaches.    Medication to treat the actual headache should be taken as soon as you realize you have a headache.    Medications such as Maxalt, Imitrex, Relpax, Zomig, can be taken only twice a day and they're only nine pills per month.  If you have frequent headaches may need to save these for the worst headaches.    Do not take pain medication every day.  Your body can become addicted to this medication and may make the headaches worse.  If you are having headaches every day, we need to increase the preventative medication, call me if this is becoming a problem.    Avoid foods that can cause migraines.  These include: Red wine, MSG, nitrates (hot dogs, or lunch meats), caffeine, chocolate, cheese.    Monosodium Glutamate (MSG)  MSG also known as Monosodium glutamate is a common food additive. MSG is used to make food taste better, and can be found in processed products, fast food, and canned soups. MSG can cause headaches when consumed in large amounts.    Fast Foods that Contain MSG  Chick-hang-A's Chicken [place] Lin   [employer] Fried Chicken's Extra Crispy Chicken Breast    Chips  (Will be listed on ingredient list Monosodium Glutamate)  Doritos  Pringles  Potato Chips    Meats  Hot dogs    Lunch Meats  Beef jerky    Sausages  Smoked meats    Pepperoni  Meat snack sticks    Zavala  Patrasmi    Hamburgers  Cold cuts    Salami  Fried  Chicken      Chicken Nuggets  Burgers    Sauces  Ketchup    Mayonnaise  Barbecue sauce   Salad dressing  Soy sauce    Mustard    Miscellaneous  Bodybuilding protein powder  Instant Ramen  Spices    Items that cause headaches without MSG  Alcohol (red wine, beer, whiskey, Scotch, and champagne )  Cheese (Aged Cheeses: including Parmesan, Swiss, Brie, Cheddar)    I can be reached at phone: [phone] or email: [email or web address]

## 2024-05-10 NOTE — PROGRESS NOTES
Subjective     Chief Complaint: Headache    Zoe Wallace is a 44 y.o. year old female who presents with chief complaint of headaches.    HPI    Zoe started getting headaches at age 34 or younger.  Headaches gradually worsening in frequency and severity over the course of few months. Generally, headaches last about 6 hours in duration. Patient has 12/30 headache days per month. The headaches are usually pounding and sharp and are located back of the neck, traveling to behind the eyes, generally symmetric. The headaches are severe that patient has to take a break, rest in a room with ice packs over eyes and over neck. Associated nausea, photophobia, and vomiting. Headaches are worsened with exertion. Triggers include barometric pressure , stress, and dehydration . Occasionally sleep positions (neck strain) and sinusitis / allergy brings on the migraines as well.     Zoe experiences headache aura. Aura occurs at onset of headache and typically lasts few minutes in duration. Aura present with unknown percent of headaches.     Work attendance or other daily activities are affected by the headaches.    Pt has seen two neurologists before -- one of which was Dr. Purcell at headache center at [facility]. At that time HA frequency only 4d/month.     She has never had any head imagings prior. Only sinus CT scans.    Current Acute Headache Treatment Nurtec (Rimegepant)   Rizatriptan   Current Preventative Headache Treatment Rimegepant (Nurtec)   Previous Acute Headache Treatment Nurtec (Rimegepant)   Rizatriptan   Previous Preventative Headache Treatment Propranolol        ROS: As per HPI, otherwise all other systems have been reviewed are negative for complaint.     Review of Systems    Past Medical History:   Diagnosis Date    Acute upper respiratory infection, unspecified 08/05/2019    Acute URI    Chronic ethmoidal sinusitis 07/15/2019    Ethmoid sinusitis    Chronic maxillary sinusitis 06/14/2019    Right maxillary  sinusitis    Encounter for screening for malignant neoplasm of vagina     Vaginal Pap smear    Gastro-esophageal reflux disease without esophagitis 03/27/2019    Gastroesophageal reflux disease, esophagitis presence not specified    Lesion of ulnar nerve, unspecified upper limb 02/11/2014    Ulnar nerve impingement    Other conditions influencing health status     Menstruation    Otitis media, unspecified, right ear 03/14/2019    Right otitis media    Personal history of other (healed) physical injury and trauma 06/19/2014    History of sprain    Personal history of other diseases of the digestive system 07/16/2014    History of irritable bowel syndrome    Personal history of other diseases of the nervous system and sense organs 04/25/2014    History of carpal tunnel syndrome    Personal history of other diseases of the respiratory system 04/25/2014    Personal history of acute sinusitis    Personal history of other diseases of the respiratory system 06/04/2013    History of extrinsic asthma    Personal history of other diseases of the respiratory system 09/29/2020    History of nasal polyp    Personal history of other diseases of the respiratory system 07/14/2015    History of acute pharyngitis    Personal history of other endocrine, nutritional and metabolic disease 07/16/2014    History of hypothyroidism    Personal history of other infectious and parasitic diseases 12/10/2018    History of candidiasis of mouth    Personal history of other specified conditions 12/14/2021    History of nasal congestion    Personal history of other specified conditions     History of snoring    Personal history of other specified conditions     History of heartburn    Unspecified injury of unspecified ankle, initial encounter 06/19/2014    Ankle injury    Unspecified otitis externa, right ear 03/14/2019    Right otitis externa    Unspecified otitis externa, unspecified ear 03/18/2019    Otitis externa     Past Surgical History:    Procedure Laterality Date    OTHER SURGICAL HISTORY  04/15/2014    Wrist Surgery    SINUS SURGERY  09/13/2013    Sinus Surgery    TOE SURGERY Right 12/2023     Family History   Problem Relation Name Age of Onset    Hypertension Mother      Hypertension Father      Asthma Father      Allergies Father       Social History     Tobacco Use    Smoking status: Former     Types: Cigarettes    Smokeless tobacco: Never   Substance Use Topics    Alcohol use: Not Currently     Comment: social        Objective   There were no vitals taken for this visit.    Neuro Exam:  Cardiac Exam: No apparent edema of b/l lower extremities  Neurological Exam:  MENTAL STATUS:   General Appearance: No distress, alert, interactive, and cooperative. Orientation was normal to time, place and person. Recent and remote memory was intact.     OPHTHALMOSCOPIC:   The ophthalmoscopic exam was normal. The fundi were well visualized with normal disc margins, clear vessels and vascular pulsations. No disc edema. The cup/disk ratio was not enlarged. No hemorrhages or exudates were present in the posterior segments that were visualized.     CRANIAL NERVES:   CN 2         Visual fields full to confrontation.   CN 3, 4, 6   Pupils round, 4 mm in diameter, equally reactive to light. Lids symmetric; no ptosis. EOMs normal alignment, full range with normal saccades, pursuit and convergence.   No nystagmus.   CN 5   Facial sensation intact bilaterally.   CN 7   Normal and symmetric facial strength. Nasolabial folds symmetric.   CN 8   Hearing intact to conversation and finger rub.  CN 9, 10   Palate elevates symmetrically.  CN 11   Normal strength of shoulder shrug and neck turning.   CN 12   Tongue midline, with normal bulk and strength; no fasciculations.     MOTOR:   Muscle bulk and tone were normal in both upper and lower extremities.   No pronator drift bilaterally.  No fasciculations, tremor or other abnormal movements evident with the patient examined  clothed.    STRENGTH:  R  L  Deltoid            5          5  Biceps  5 5  Triceps  5 5    Hip flexion 5 5  Knee Flex 5 5  Knee Ex 5 5    REFLEXES: R L  Biceps  2 2                     Triceps  2 2  Patellar  2 2     SENSORY:   In both upper and lower extremities, sensation was intact to light touch.    COORDINATION:   In both upper extremities, finger-nose-finger was intact without dysmetria or overshoot.     GAIT:   Station was stable with a normal base. Gait was stable with a normal arm swing and speed. No ataxia, shuffling, steppage or waddling was present. No circumduction was present. No Romberg sign was present.    Neurological Exam  Physical Exam    Results  Lab Results   Component Value Date     11/27/2023    K 4.3 11/27/2023     11/27/2023    CO2 24 11/27/2023    BUN 13 11/27/2023    CREATININE 1.17 (H) 11/27/2023    GLUCOSE 88 11/27/2023    CALCIUM 9.4 11/27/2023     Lab Results   Component Value Date    WBC 11.0 11/27/2023    HGB 15.5 11/27/2023    HCT 48.9 (H) 11/27/2023    MCV 98 11/27/2023     11/27/2023     Lab Results   Component Value Date    CHOL 143 11/27/2023    TRIG 301 (H) 11/27/2023    HDL 31.3 11/27/2023       No CTH or MRI on file                      Assessment/Plan   44yoF with hx of chronic sinusitis and chronic migraine headaches with aura who presents for worsened frequency of migraine headaches. Currently has 2-3ha/week, 10-12HA days/month, lasting 6 hours, with nausea and photophobia, affecting her work. Not a candidate for another SSRI / tricyclics as she is already on sertraline. Already failed propranolol for drowsiness prior. Nurtec has been efffective as abortives but not in term of preventative means.    Given the frequency and description of headaches, Zoe likely has chronic migraines with visual aura.  Per our discussion, we will start topiramate for headache prevention. Will start low at 25mg daily, follow up in 1 month virtually and uptitrate if no  side effects.    Continue Nurtec and rizatriptan for acute headache treatment.      MRI brain since patient has never had a brain imaging, and HA frequency changed.    Next steps if topiramate fails would be botox or alternative CGRP agent.     I personally spent 75 minutes today, exclusive of procedures, providing care for this patient, including preparation, face to face time, documentation and other services such as review of medical records, diagnostic result, patient education, counseling, coordination of care as specified in the encounter.     Patient seen, evaluated and discussed with attending physician, Kiley Somers PGY5  Vascular Neurology Fellow

## 2024-05-12 DIAGNOSIS — K21.9 GASTROESOPHAGEAL REFLUX DISEASE WITHOUT ESOPHAGITIS: ICD-10-CM

## 2024-05-13 ENCOUNTER — APPOINTMENT (OUTPATIENT)
Dept: OTOLARYNGOLOGY | Facility: CLINIC | Age: 44
End: 2024-05-13
Payer: COMMERCIAL

## 2024-05-13 DIAGNOSIS — G43.009 MIGRAINE WITHOUT AURA, NOT REFRACTORY: ICD-10-CM

## 2024-05-13 RX ORDER — FAMOTIDINE 40 MG/1
40 TABLET, FILM COATED ORAL 2 TIMES DAILY
Qty: 180 TABLET | Refills: 1 | Status: SHIPPED | OUTPATIENT
Start: 2024-05-13

## 2024-05-13 NOTE — TELEPHONE ENCOUNTER
----- Message from Arlette Mccormick LPN sent at 5/13/2024  4:52 PM EDT -----  Regarding: FW: Refills  Contact: [phone]    ----- Message -----  From: Zoe Wallace  Sent: 5/13/2024   4:45 PM EDT  To: [facility] Clinical Support Staff  Subject: Refills                                          My nurtec and rizatriptin needs refills please

## 2024-05-13 NOTE — TELEPHONE ENCOUNTER
Requested Prescriptions     Pending Prescriptions Disp Refills    Nurtec ODT 75 mg tablet,disintegrating [Pharmacy Med Name: NURTEC ODT 75 MG TABLET] 16 tablet 2     Sig: DISSOLVE 1 TABLET BY MOUTH EVERY OTHER DAY. ON THE TONGUE AND ALLOW TO DISSOLVE WITH MILD TO MODERATE HEADACHE    rizatriptan MLT (Maxalt-MLT) 10 mg disintegrating tablet 9 tablet 2     Sig: Take 1 tablet (10 mg) by mouth once daily. with onset of moderate to severe headache may repeat dose in 2 hours if needed. no more than 2 doses in 24 hour period Orally     l

## 2024-05-14 RX ORDER — RIMEGEPANT SULFATE 75 MG/75MG
TABLET, ORALLY DISINTEGRATING ORAL
Qty: 16 TABLET | Refills: 2 | Status: SHIPPED | OUTPATIENT
Start: 2024-05-14

## 2024-05-14 RX ORDER — RIZATRIPTAN BENZOATE 10 MG/1
10 TABLET, ORALLY DISINTEGRATING ORAL DAILY
Qty: 9 TABLET | Refills: 2 | Status: SHIPPED | OUTPATIENT
Start: 2024-05-14

## 2024-05-14 RX ORDER — RIZATRIPTAN BENZOATE 10 MG/1
TABLET, ORALLY DISINTEGRATING ORAL
Qty: 9 TABLET | Refills: 2 | Status: SHIPPED | OUTPATIENT
Start: 2024-05-14

## 2024-05-28 ENCOUNTER — APPOINTMENT (OUTPATIENT)
Dept: RADIOLOGY | Facility: HOSPITAL | Age: 44
End: 2024-05-28
Payer: COMMERCIAL

## 2024-05-28 DIAGNOSIS — G43.009 MIGRAINE WITHOUT AURA, NOT REFRACTORY: Primary | ICD-10-CM

## 2024-05-28 RX ORDER — DIAZEPAM 5 MG/1
5 TABLET ORAL 2 TIMES DAILY
Qty: 2 TABLET | Refills: 0 | Status: SHIPPED | OUTPATIENT
Start: 2024-05-28 | End: 2024-05-29

## 2024-05-30 ENCOUNTER — LAB REQUISITION (OUTPATIENT)
Dept: LAB | Facility: HOSPITAL | Age: 44
End: 2024-05-30
Payer: COMMERCIAL

## 2024-05-30 DIAGNOSIS — R30.0 DYSURIA: ICD-10-CM

## 2024-05-30 DIAGNOSIS — N39.0 URINARY TRACT INFECTION, SITE NOT SPECIFIED: ICD-10-CM

## 2024-05-30 PROCEDURE — 87086 URINE CULTURE/COLONY COUNT: CPT

## 2024-06-01 LAB — BACTERIA UR CULT: ABNORMAL

## 2024-06-04 ENCOUNTER — CLINICAL SUPPORT (OUTPATIENT)
Dept: OBSTETRICS AND GYNECOLOGY | Facility: CLINIC | Age: 44
End: 2024-06-04
Payer: COMMERCIAL

## 2024-06-04 DIAGNOSIS — Z30.42 ENCOUNTER FOR DEPO-PROVERA CONTRACEPTION: ICD-10-CM

## 2024-06-04 PROCEDURE — 96372 THER/PROPH/DIAG INJ SC/IM: CPT | Performed by: OBSTETRICS & GYNECOLOGY

## 2024-06-04 RX ORDER — MEDROXYPROGESTERONE ACETATE 150 MG/ML
150 INJECTION, SUSPENSION INTRAMUSCULAR ONCE
Status: COMPLETED | OUTPATIENT
Start: 2024-06-04 | End: 2024-06-04

## 2024-06-04 RX ADMIN — MEDROXYPROGESTERONE ACETATE 150 MG: 150 INJECTION, SUSPENSION INTRAMUSCULAR at 10:10

## 2024-06-04 NOTE — PROGRESS NOTES
ISABEL IS HERE TODAY FOR HER REGULAR DEPO INJECTION    HER NEXT WINDOW FOR INJECTION WILL BE 08/20/2024 - 09/03/2024    INJECTION WAS GIVEN INTRAMUSCULARLY IN HER RIGHT GLUTEAL AREA    PATIENT TOLERATED WELL    NDC - [address]  LOT - [id number]  EXP - 03/31/2027

## 2024-06-09 DIAGNOSIS — G43.009 MIGRAINE WITHOUT AURA, NOT REFRACTORY: ICD-10-CM

## 2024-06-11 RX ORDER — TOPIRAMATE 25 MG/1
25 TABLET ORAL DAILY
Qty: 30 TABLET | Refills: 0 | Status: SHIPPED | OUTPATIENT
Start: 2024-06-11 | End: 2024-06-13

## 2024-06-13 ENCOUNTER — HOSPITAL ENCOUNTER (OUTPATIENT)
Dept: RADIOLOGY | Facility: HOSPITAL | Age: 44
Discharge: HOME | End: 2024-06-13
Payer: COMMERCIAL

## 2024-06-13 ENCOUNTER — TELEMEDICINE (OUTPATIENT)
Dept: NEUROLOGY | Facility: HOSPITAL | Age: 44
End: 2024-06-13
Payer: COMMERCIAL

## 2024-06-13 ENCOUNTER — TELEPHONE (OUTPATIENT)
Dept: PRIMARY CARE | Facility: CLINIC | Age: 44
End: 2024-06-13

## 2024-06-13 DIAGNOSIS — G43.009 MIGRAINE WITHOUT AURA, NOT REFRACTORY: ICD-10-CM

## 2024-06-13 PROCEDURE — 70553 MRI BRAIN STEM W/O & W/DYE: CPT

## 2024-06-13 PROCEDURE — A9575 INJ GADOTERATE MEGLUMI 0.1ML: HCPCS | Performed by: STUDENT IN AN ORGANIZED HEALTH CARE EDUCATION/TRAINING PROGRAM

## 2024-06-13 PROCEDURE — 2550000001 HC RX 255 CONTRASTS: Performed by: STUDENT IN AN ORGANIZED HEALTH CARE EDUCATION/TRAINING PROGRAM

## 2024-06-13 PROCEDURE — 99213 OFFICE O/P EST LOW 20 MIN: CPT | Mod: GT,GC,95 | Performed by: PSYCHIATRY & NEUROLOGY

## 2024-06-13 PROCEDURE — 99213 OFFICE O/P EST LOW 20 MIN: CPT | Performed by: PSYCHIATRY & NEUROLOGY

## 2024-06-13 PROCEDURE — 3008F BODY MASS INDEX DOCD: CPT | Performed by: PSYCHIATRY & NEUROLOGY

## 2024-06-13 RX ORDER — TOPIRAMATE 25 MG/1
50 TABLET ORAL DAILY
Qty: 180 TABLET | Refills: 3 | Status: SHIPPED | OUTPATIENT
Start: 2024-06-13 | End: 2025-06-08

## 2024-06-13 RX ORDER — FLUCONAZOLE 100 MG/1
TABLET ORAL
Qty: 5 TABLET | OUTPATIENT
Start: 2024-06-13

## 2024-06-13 RX ORDER — GADOTERATE MEGLUMINE 376.9 MG/ML
0.2 INJECTION INTRAVENOUS
Status: COMPLETED | OUTPATIENT
Start: 2024-06-13 | End: 2024-06-13

## 2024-06-13 NOTE — PATIENT INSTRUCTIONS
Keep a calendar of your migraines.    Wear hair loose.      Exercise regularly, get plenty of fresh air and regular, sleep.    Preventative medication should be taken every day regardless of whether or not, you have a headache.  It is to prevent headaches.    Medication to treat the actual headache should be taken as soon as you realize you have a headache.    Medications such as Maxalt, Imitrex, Relpax, Zomig, can be taken only twice a day and they're only nine pills per month.  If you have frequent headaches may need to save these for the worst headaches.    Do not take pain medication every day.  Your body can become addicted to this medication and may make the headaches worse.  If you are having headaches every day, we need to increase the preventative medication, call me if this is becoming a problem.    Avoid foods that can cause migraines.  These include: Red wine, MSG, nitrates (hot dogs, or lunch meats), caffeine, chocolate, cheese.    Monosodium Glutamate (MSG)  MSG also known as Monosodium glutamate is a common food additive. MSG is used to make food taste better, and can be found in processed products, fast food, and canned soups. MSG can cause headaches when consumed in large amounts.    Fast Foods that Contain MSG  Chick-hang-A's Chicken [place]   [employer]'s Extra Crispy Chicken Breast    Chips  (Will be listed on ingredient list Monosodium Glutamate)  Doritos  Pringles  Potato Chips    Meats  Hot dogs    Lunch Meats  Beef jerky    Sausages  Smoked meats    Pepperoni  Meat snack sticks    Zavala  Patrasmi    Hamburgers  Cold cuts    Salami  Fried Chicken      Chicken Nuggets  Burgers    Sauces  Ketchup    Mayonnaise  Barbecue sauce   Salad dressing  Soy sauce    Mustard    Miscellaneous  Bodybuilding protein powder  Instant Ramen  Spices    Items that cause headaches without MSG  Alcohol (red wine, beer, whiskey, Scotch, and champagne )  Cheese (Aged Cheeses: including Parmesan, Swiss,  Sammy Guerrero    I can be reached at phone: [phone] or email: [email or web address]

## 2024-06-13 NOTE — PROGRESS NOTES
Assessment and Plan:  Migraine with visual aura: MRI nonspecific WM changes only. On topiramate 25mg at bedtime and nurtec ODT every other day, rizatriptan for acute abortive. Frequency now reduced to 6HA / month by adding topiramate to nurtec. Will try to increase topiramate to 50mg / day (either 25mg BID or 50mg at bedtime, pt will try 25mg BID first) and follow up in 6 months.  Continue zofran (at home) and phenergan (at work) for nausea.    Patient seen, evaluated and discussed with attending physician, Dr. Chacon.    Kiley Yee PGY5  Vascular Neurology Fellow       Orders:  Topiramate 25mg BID ordered     HPI:   Zoe Wallace is a 44 y.o. right handed female with hx of migraines who follows up virtually for her headaches.    HA started at age 35yo (or younger). Gradually worsening in frequency and severity in the past several months. Lasts 6 hours in duration, pounding and sharp, located in the back of the neck, travels forward to behind the eyes. T has to take a break / rest in room with ice packs over eyes and neck when she gets headaches. +Nausea, photophobia and vomiting, worsens with exertion. Affecting her work.     Triggers: barometric pressure, stress, dehydration. Certain sleep positions (neck strain), sinusitis / allergy brings on the migraines.     Was on propranolol before, not effective. Nurtec and rizatriptan for abortive which has been effective.     Current Acute Headache Treatment Rizatriptan   Current Preventative Headache Treatment Topiramate, Nrutec every other day   Previous Acute Headache Treatment Nurtec (Rimegepant) PRN  Rizatriptan   Previous Preventative Headache Treatment Propranolol        Pt was last seen 5/10/2024. Started on low dose topiramate 25mg at bedtime to ensure pt tolerates it. Zofran for nausea at home because it makes her sleepy, promethazine for nausea at work to avoid somnolence.     MRI was done today and showed nonspecific white matter changes. Pt stats she  has been doing well. HA frequency down to 6 days in the past 1 month. 2 migraine headaches aborted with rizatriptan. Nausea tolerated with zofran / promethazine. No significant side effects. Minor tingling sensation at  feet, did notice taste change when drinking soda. No cognitive side effect.          Current Outpatient Medications on File Prior to Visit   Medication Sig Dispense Refill    albuterol 90 mcg/actuation inhaler Inhale 2 puffs 4 times a day as needed.      amLODIPine (Norvasc) 5 mg tablet Take 1 tablet (5 mg) by mouth once daily. 30 tablet 0    ascorbic acid (Vitamin C) 500 mg tablet Take 1 tablet (500 mg) by mouth.      budesonide (Pulmicort) 1 mg/2 mL nebulizer solution       calcium carbonate (Oscal) 500 mg calcium (1,250 mg) tablet Take 1 tablet (1,250 mg) by mouth 2 times a day with meals.      cetirizine (ZyrTEC) 10 mg tablet Take 1 tablet (10 mg) by mouth once daily. FOR 90 DAYS for 90      cholecalciferol (Vitamin D-3) 50 MCG (2000 UT) tablet Take 1 tablet (2,000 Units) by mouth once daily.      clobetasol (Temovate) 0.05 % ointment Apply topically once daily.  APPLY SPARINGLY TO AFFECTED AREA 30 g 0    cyanocobalamin (Vitamin B-12) 100 mcg tablet Take by mouth.      diazePAM (Valium) 5 mg tablet Take 1 tablet (5 mg) by mouth 2 times a day for 2 doses. Take one pill 30 min prior to MRI and second pill as needed to complete MRI 2 tablet 0    esomeprazole (NexIUM) 20 mg DR capsule Take 1 capsule (20 mg) by mouth 2 times a day.      famotidine (Pepcid) 40 mg tablet TAKE 1 TABLET BY MOUTH 2 TIMES A  tablet 1    fluticasone (Flonase) 50 mcg/actuation nasal spray Administer 1 spray into each nostril once daily. Shake gently. Before first use, prime pump. After use, clean tip and replace cap. 48 g 1    hydroCHLOROthiazide (HYDRODiuril) 12.5 mg tablet Take 1 tablet (12.5 mg) by mouth once daily. For 90 days      losartan (Cozaar) 100 mg tablet Take 1 tablet (100 mg) by mouth once daily.       magnesium carb,citrate,oxide (MAGNESIUM COMPLEX ORAL) Take 500 mg by mouth once daily.      medroxyPROGESTERone (Depo-Provera) 150 mg/mL injection Inject 1 mL (150 mg) into the muscle every 12 weeks.      montelukast (Singulair) 10 mg tablet Take 1 tablet (10 mg) by mouth once daily.      Nurtec ODT 75 mg tablet,disintegrating DISSOLVE 1 TABLET BY MOUTH EVERY OTHER DAY. ON THE TONGUE AND ALLOW TO DISSOLVE WITH MILD TO MODERATE HEADACHE 16 tablet 2    [] ondansetron (Zofran) 4 mg tablet Take 2 tablets (8 mg) by mouth every 8 hours if needed for nausea or vomiting. 30 tablet 1    promethazine (Phenergan) 25 mg tablet Take 1 tablet (25 mg) by mouth every 8 hours if needed for nausea or vomiting. 90 tablet 1    rizatriptan MLT (Maxalt-MLT) 10 mg disintegrating tablet DISSOLVE 1 TABLET (10 MG) BY MOUTH ONCE DAILY. WITH ONSET OF MODERATE TO SEVERE HEADACHE MAY REPEAT DOSE IN 2 HOURS IF NEEDED. NO MORE THAN 2 DOSES IN 24 HOUR PERIOD 9 tablet 2    rizatriptan MLT (Maxalt-MLT) 10 mg disintegrating tablet Take 1 tablet (10 mg) by mouth once daily. with onset of moderate to severe headache may repeat dose in 2 hours if needed. no more than 2 doses in 24 hour period Orally 9 tablet 2    sertraline (Zoloft) 25 mg tablet Take 1 tablet (25 mg) by mouth once daily. 90 tablet 1    sod chloride-sodium bicarb (Nasal Wash) nasal rinse       sod sulf-pot chloride-mag sulf (Sutab) 1.479-0.188- 0.225 gram tablet as directed oral once for 1 days      sucralfate (Carafate) 1 gram tablet Take 1 tablet (1 g) by mouth every 6 hours.      topiramate (Topamax) 25 mg tablet Take 1 tablet (25 mg) by mouth once daily. 30 tablet 0    triamcinolone (Nasacort) 55 mcg nasal inhaler Administer 2 sprays into affected nostril(s) once daily.      triamcinolone acetonide 0.05 % ointment Apply topically 2 times a day.  APPLY TO AFFECTED AREA EXTERNALLY      zinc gluconate 50 mg tablet Take 1 tablet (50 mg) by mouth once daily.      [DISCONTINUED]  topiramate (Topamax) 25 mg tablet Take 1 tablet (25 mg) by mouth once daily. 30 tablet 2     No current facility-administered medications on file prior to visit.        Patient Active Problem List   Diagnosis    HTN (hypertension)    Allergic rhinosinusitis    DUB (dysfunctional uterine bleeding)    Fatty infiltration of liver    Gastroesophageal reflux disease without esophagitis    Generalized anxiety disorder    Mannose-binding lectin deficiency (Multi)    Migraine without aura, not refractory    Mild intermittent asthma (HHS-HCC)    NIKOLAI (obstructive sleep apnea)    Primary insomnia    Seasonal and perennial allergic rhinoconjunctivitis of both eyes    Temporomandibular joint pain dysfunction syndrome    Vitamin D deficiency        Past Surgical History:   Procedure Laterality Date    OTHER SURGICAL HISTORY  04/15/2014    Wrist Surgery    SINUS SURGERY  09/13/2013    Sinus Surgery    TOE SURGERY Right 12/2023       Allergies   Allergen Reactions    Amoxicillin-Pot Clavulanate Other, Diarrhea, Nausea Only and Unknown     GI: Nausea/Vomiting    Cat Dander Unknown    Doxycycline Hives    House Dust Unknown    House Dust Mite Unknown    Mold Unknown    Moxifloxacin Hives, Other and Swelling     bumps on tongue    Pollen Extracts Other    Ragweed Unknown    Sulfamethoxazole-Trimethoprim Hives, Swelling and Other     SWELLING OF LIPS; Facial Swelling    Tree And Shrub Pollen Unknown    Clarithromycin Itching and Rash        Family History   Problem Relation Name Age of Onset    Hypertension Mother      Hypertension Father      Asthma Father      Allergies Father          Social History     Tobacco Use    Smoking status: Former     Types: Cigarettes    Smokeless tobacco: Never   Substance Use Topics    Alcohol use: Not Currently     Comment: social    Drug use: Not Currently       ROS:    General: No symptoms reported      Objective:    There were no vitals taken for this visit. This was a virtual visit.     Neurologic  Exam:     Awake, alert, language grossly normal, face symmetric      Extensive review of notes in EMR, labs, tests, Interpretation of neuroimaging as documented in the HPI or Assessment and Plan.     MRI imaging from today reviewed as below.   MR brain w and wo IV contrast    Result Date: 6/13/2024  Interpreted By:  Edy Kerns,  and Chalino Messina STUDY: MR BRAIN W AND WO IV CONTRAST;  6/13/2024 9:08 am   INDICATION: Signs/Symptoms:headache, change in characteristics.   Per EMR 44-year-old female with history of migraine headaches has had experiencing increased headache frequency.   COMPARISON: None.   ACCESSION NUMBER(S): [id number]   ORDERING CLINICIAN: DIMITRIS RUVALCABA   TECHNIQUE: Axial T2, FLAIR, DWI, gradient echo T2 and sagittal and coronal T1 weighted images of brain were acquired. Post contrast T1 weighted images were acquired after administration of 20 ML Dotarem gadolinium based intravenous contrast.   FINDINGS: No diffusion restriction abnormality to suggest acute infarct. Punctate gradient susceptibility weighted imaging signal abnormality within the right globus pallidus that likely represents prior microhemorrhage. Mild-to-moderate degree of T2/FLAIR hyperintensities within the periventricular, subcortical, and deep white matter that is nonspecific. No evidence of herniation, midline shift, or mass effect. No masslike or nodular contrast enhancement.   CSF Spaces: The ventricles, sulci and basal cisterns are within normal limits. Small left choroid plexus cyst.   Paranasal Sinuses and Mastoids: Mild nonspecific mucosal thickening within the right maxillary sinus. Otherwise, visualized paranasal sinuses and mastoid air cells are unremarkable.       Mild-to-moderate degree of T2/FLAIR hyperintensities within the periventricular, subcortical, and deep white matter. These findings are nonspecific, but have been reported with migraine headaches given patient's clinical history.   I personally  reviewed the images/study and I agree with the findings as stated by Siddharth Allred MD. This study was interpreted at [facility], [place], [place].   MACRO: None   Signed by: Edy Kerns 6/13/2024 10:35 AM Dictation workstation:   [id number]

## 2024-06-20 NOTE — TELEPHONE ENCOUNTER
Called Dr. Vines's office, [phone], and a message was taken for Katiana to call me back tomorrow as she is with patient's.

## 2024-06-21 NOTE — TELEPHONE ENCOUNTER
Tried Dr. Vines's office again.  Katiana was not in today but I did speak with someone who is going to try to find the other paper that should have come with the consent and fax that.  If she can't find it today, she will have Katiana fax form on Monday.  They use Epi-Pens for the anaphylaxis, zofran for nausea, zyrtec/claritin for hives, and triamcinolone 1% cream for injection site reaction.

## 2024-07-01 DIAGNOSIS — Z30.42 ENCOUNTER FOR DEPO-PROVERA CONTRACEPTION: Primary | ICD-10-CM

## 2024-07-01 RX ORDER — MEDROXYPROGESTERONE ACETATE 150 MG/ML
INJECTION, SUSPENSION INTRAMUSCULAR
Qty: 1 ML | Refills: 0 | Status: SHIPPED | OUTPATIENT
Start: 2024-07-01

## 2024-07-06 DIAGNOSIS — F41.1 GENERALIZED ANXIETY DISORDER: ICD-10-CM

## 2024-07-07 RX ORDER — SERTRALINE HYDROCHLORIDE 25 MG/1
25 TABLET, FILM COATED ORAL DAILY
Qty: 90 TABLET | Refills: 1 | Status: SHIPPED | OUTPATIENT
Start: 2024-07-07

## 2024-07-18 ENCOUNTER — APPOINTMENT (OUTPATIENT)
Dept: OBSTETRICS AND GYNECOLOGY | Facility: CLINIC | Age: 44
End: 2024-07-18
Payer: COMMERCIAL

## 2024-07-29 DIAGNOSIS — Z30.9 ENCOUNTER FOR CONTRACEPTIVE MANAGEMENT, UNSPECIFIED: ICD-10-CM

## 2024-07-29 RX ORDER — MEDROXYPROGESTERONE ACETATE 150 MG/ML
INJECTION, SUSPENSION INTRAMUSCULAR
Qty: 1 ML | Refills: 0 | Status: SHIPPED | OUTPATIENT
Start: 2024-07-29

## 2024-08-01 ENCOUNTER — APPOINTMENT (OUTPATIENT)
Dept: RADIOLOGY | Facility: HOSPITAL | Age: 44
End: 2024-08-01
Payer: COMMERCIAL

## 2024-08-05 ENCOUNTER — HOSPITAL ENCOUNTER (OUTPATIENT)
Dept: RADIOLOGY | Facility: HOSPITAL | Age: 44
Discharge: HOME | End: 2024-08-05
Payer: COMMERCIAL

## 2024-08-05 DIAGNOSIS — R07.81 PLEURODYNIA: ICD-10-CM

## 2024-08-05 DIAGNOSIS — R07.81 RIB PAIN: ICD-10-CM

## 2024-08-05 PROCEDURE — 71250 CT THORAX DX C-: CPT

## 2024-08-05 PROCEDURE — 76377 3D RENDER W/INTRP POSTPROCES: CPT

## 2024-08-15 ENCOUNTER — TELEPHONE (OUTPATIENT)
Dept: PRIMARY CARE | Facility: CLINIC | Age: 44
End: 2024-08-15

## 2024-08-15 ENCOUNTER — TELEMEDICINE (OUTPATIENT)
Dept: PRIMARY CARE | Facility: CLINIC | Age: 44
End: 2024-08-15
Payer: COMMERCIAL

## 2024-08-15 DIAGNOSIS — R91.1 NODULE OF UPPER LOBE OF RIGHT LUNG: ICD-10-CM

## 2024-08-15 DIAGNOSIS — U07.1 COVID-19: Primary | ICD-10-CM

## 2024-08-15 PROCEDURE — 1036F TOBACCO NON-USER: CPT | Performed by: FAMILY MEDICINE

## 2024-08-15 PROCEDURE — 99213 OFFICE O/P EST LOW 20 MIN: CPT | Performed by: FAMILY MEDICINE

## 2024-08-15 RX ORDER — BENZONATATE 200 MG/1
200 CAPSULE ORAL 3 TIMES DAILY PRN
Qty: 30 CAPSULE | Refills: 0 | Status: SHIPPED | OUTPATIENT
Start: 2024-08-15 | End: 2024-09-14

## 2024-08-15 RX ORDER — NIRMATRELVIR AND RITONAVIR 300-100 MG
3 KIT ORAL 2 TIMES DAILY
Qty: 30 TABLET | Refills: 0 | Status: SHIPPED | OUTPATIENT
Start: 2024-08-15 | End: 2024-08-20

## 2024-08-15 ASSESSMENT — ENCOUNTER SYMPTOMS: COUGH: 1

## 2024-08-15 NOTE — TELEPHONE ENCOUNTER
She started having sxs yesterday and today she tested positive for covid, said it is heavy in her chest. I wsn't sure where to put V V at.

## 2024-08-15 NOTE — PROGRESS NOTES
Subjective   Patient ID: Zoe Wallace is a 44 y.o. female who presents for No chief complaint on file..    URI/Sinusitis/Bronchitis:  -Sx's: Pt tested positive for covid this am.  Myalgia.  Sneezing, congestion.  No fevers or chills.  No SOB.    -Duration: Started yesterday  -Treatment: Using ibuprofen and tylenol.      Note: pt had CT of chest - showed 8mm nodule - due for recheck in 6-12 months. Order in system    Cough         Review of Systems   Respiratory:  Positive for cough.        Objective   There were no vitals taken for this visit.    Physical Exam  Vitals reviewed: Telehealth visit - no respiratory distress during video.         Assessment/Plan   Diagnoses and all orders for this visit:  COVID-19  -     nirmatrelvir-ritonavir (Paxlovid) 300 mg (150 mg x 2)-100 mg tablet therapy pack; Take 3 tablets by mouth 2 times a day for 5 days. Follow the instructions on the package  -     benzonatate (Tessalon) 200 mg capsule; Take 1 capsule (200 mg) by mouth 3 times a day as needed for cough. Do not crush or chew.  Nodule of upper lobe of right lung    COVID Positive:  Education on COVID -19 that included but was not limited to:  expected course of illness,   supportive care measures and the signs and symptoms that are alarming and when to call 911 or seek medical attention.   Also - talked about the need to isolate for at least 5  days from the onset of symptoms, and then,  if feeling obviously better and  no fever without meds for at least 24 hours - can be out of house with a mask on for the remainder of the 10 days.   Discussed contagiousness disease.     Discussed  moving a lot, a lot of fluids,Tylenol or Ibuprofen prn for fevers/muscle aches,  Mucinex/Coricidin Congestion and cough prn,  saline spray and steam often.     If patient is a candidate, discussed RX for Paxlovid,  risks and benefits.  Discussed side effects, including metal taste, and the possibility of rebound symptoms.

## 2024-08-29 ENCOUNTER — APPOINTMENT (OUTPATIENT)
Dept: CARDIOLOGY | Facility: HOSPITAL | Age: 44
End: 2024-08-29
Payer: COMMERCIAL

## 2024-08-29 ENCOUNTER — APPOINTMENT (OUTPATIENT)
Dept: RADIOLOGY | Facility: HOSPITAL | Age: 44
End: 2024-08-29
Payer: COMMERCIAL

## 2024-08-29 ENCOUNTER — CLINICAL SUPPORT (OUTPATIENT)
Dept: OBSTETRICS AND GYNECOLOGY | Facility: CLINIC | Age: 44
End: 2024-08-29
Payer: COMMERCIAL

## 2024-08-29 ENCOUNTER — HOSPITAL ENCOUNTER (EMERGENCY)
Facility: HOSPITAL | Age: 44
Discharge: HOME | End: 2024-08-29
Payer: COMMERCIAL

## 2024-08-29 VITALS
BODY MASS INDEX: 32.47 KG/M2 | DIASTOLIC BLOOD PRESSURE: 78 MMHG | RESPIRATION RATE: 18 BRPM | TEMPERATURE: 97.3 F | SYSTOLIC BLOOD PRESSURE: 120 MMHG | OXYGEN SATURATION: 97 % | HEART RATE: 88 BPM | HEIGHT: 66 IN | WEIGHT: 202 LBS

## 2024-08-29 DIAGNOSIS — E86.0 DEHYDRATION: ICD-10-CM

## 2024-08-29 DIAGNOSIS — R11.2 NAUSEA AND VOMITING, UNSPECIFIED VOMITING TYPE: Primary | ICD-10-CM

## 2024-08-29 DIAGNOSIS — Z30.42 ENCOUNTER FOR DEPO-PROVERA CONTRACEPTION: ICD-10-CM

## 2024-08-29 LAB
ALBUMIN SERPL BCP-MCNC: 4.7 G/DL (ref 3.4–5)
ALP SERPL-CCNC: 78 U/L (ref 33–110)
ALT SERPL W P-5'-P-CCNC: 23 U/L (ref 7–45)
ANION GAP SERPL CALC-SCNC: 20 MMOL/L (ref 10–20)
APPEARANCE UR: CLEAR
AST SERPL W P-5'-P-CCNC: 18 U/L (ref 9–39)
B-HCG SERPL-ACNC: <2 MIU/ML
BASOPHILS # BLD AUTO: 0.06 X10*3/UL (ref 0–0.1)
BASOPHILS NFR BLD AUTO: 0.3 %
BILIRUB SERPL-MCNC: 1.9 MG/DL (ref 0–1.2)
BILIRUB UR STRIP.AUTO-MCNC: NEGATIVE MG/DL
BUN SERPL-MCNC: 15 MG/DL (ref 6–23)
CALCIUM SERPL-MCNC: 9.6 MG/DL (ref 8.6–10.3)
CAOX CRY #/AREA UR COMP ASSIST: NORMAL /HPF
CARDIAC TROPONIN I PNL SERPL HS: 3 NG/L (ref 0–13)
CARDIAC TROPONIN I PNL SERPL HS: 3 NG/L (ref 0–13)
CHLORIDE SERPL-SCNC: 107 MMOL/L (ref 98–107)
CO2 SERPL-SCNC: 18 MMOL/L (ref 21–32)
COLOR UR: YELLOW
CREAT SERPL-MCNC: 0.88 MG/DL (ref 0.5–1.05)
EGFRCR SERPLBLD CKD-EPI 2021: 83 ML/MIN/1.73M*2
EOSINOPHIL # BLD AUTO: 0.55 X10*3/UL (ref 0–0.7)
EOSINOPHIL NFR BLD AUTO: 3.1 %
ERYTHROCYTE [DISTWIDTH] IN BLOOD BY AUTOMATED COUNT: 13.2 % (ref 11.5–14.5)
GLUCOSE SERPL-MCNC: 102 MG/DL (ref 74–99)
GLUCOSE UR STRIP.AUTO-MCNC: NORMAL MG/DL
HCT VFR BLD AUTO: 52.5 % (ref 36–46)
HGB BLD-MCNC: 17.3 G/DL (ref 12–16)
IMM GRANULOCYTES # BLD AUTO: 0.05 X10*3/UL (ref 0–0.7)
IMM GRANULOCYTES NFR BLD AUTO: 0.3 % (ref 0–0.9)
KETONES UR STRIP.AUTO-MCNC: ABNORMAL MG/DL
LEUKOCYTE ESTERASE UR QL STRIP.AUTO: NEGATIVE
LIPASE SERPL-CCNC: 18 U/L (ref 9–82)
LYMPHOCYTES # BLD AUTO: 1.91 X10*3/UL (ref 1.2–4.8)
LYMPHOCYTES NFR BLD AUTO: 10.8 %
MAGNESIUM SERPL-MCNC: 2.42 MG/DL (ref 1.6–2.4)
MCH RBC QN AUTO: 30.9 PG (ref 26–34)
MCHC RBC AUTO-ENTMCNC: 33 G/DL (ref 32–36)
MCV RBC AUTO: 94 FL (ref 80–100)
MONOCYTES # BLD AUTO: 0.97 X10*3/UL (ref 0.1–1)
MONOCYTES NFR BLD AUTO: 5.5 %
MUCOUS THREADS #/AREA URNS AUTO: NORMAL /LPF
NEUTROPHILS # BLD AUTO: 14.19 X10*3/UL (ref 1.2–7.7)
NEUTROPHILS NFR BLD AUTO: 80 %
NITRITE UR QL STRIP.AUTO: NEGATIVE
NRBC BLD-RTO: 0 /100 WBCS (ref 0–0)
PH UR STRIP.AUTO: 5.5 [PH]
PLATELET # BLD AUTO: 337 X10*3/UL (ref 150–450)
POTASSIUM SERPL-SCNC: 4.1 MMOL/L (ref 3.5–5.3)
PROT SERPL-MCNC: 7.8 G/DL (ref 6.4–8.2)
PROT UR STRIP.AUTO-MCNC: ABNORMAL MG/DL
RBC # BLD AUTO: 5.6 X10*6/UL (ref 4–5.2)
RBC # UR STRIP.AUTO: NEGATIVE /UL
RBC #/AREA URNS AUTO: NORMAL /HPF
SARS-COV-2 RNA RESP QL NAA+PROBE: NOT DETECTED
SODIUM SERPL-SCNC: 141 MMOL/L (ref 136–145)
SP GR UR STRIP.AUTO: 1.03
SQUAMOUS #/AREA URNS AUTO: NORMAL /HPF
UROBILINOGEN UR STRIP.AUTO-MCNC: NORMAL MG/DL
WBC # BLD AUTO: 17.7 X10*3/UL (ref 4.4–11.3)
WBC #/AREA URNS AUTO: NORMAL /HPF

## 2024-08-29 PROCEDURE — 99284 EMERGENCY DEPT VISIT MOD MDM: CPT | Mod: 25 | Performed by: PHYSICIAN ASSISTANT

## 2024-08-29 PROCEDURE — 84484 ASSAY OF TROPONIN QUANT: CPT | Performed by: PHYSICIAN ASSISTANT

## 2024-08-29 PROCEDURE — 96374 THER/PROPH/DIAG INJ IV PUSH: CPT | Performed by: PHYSICIAN ASSISTANT

## 2024-08-29 PROCEDURE — 96361 HYDRATE IV INFUSION ADD-ON: CPT | Performed by: PHYSICIAN ASSISTANT

## 2024-08-29 PROCEDURE — 36415 COLL VENOUS BLD VENIPUNCTURE: CPT | Performed by: PHYSICIAN ASSISTANT

## 2024-08-29 PROCEDURE — 80053 COMPREHEN METABOLIC PANEL: CPT | Performed by: PHYSICIAN ASSISTANT

## 2024-08-29 PROCEDURE — 71045 X-RAY EXAM CHEST 1 VIEW: CPT

## 2024-08-29 PROCEDURE — 84702 CHORIONIC GONADOTROPIN TEST: CPT | Performed by: PHYSICIAN ASSISTANT

## 2024-08-29 PROCEDURE — 85025 COMPLETE CBC W/AUTO DIFF WBC: CPT | Performed by: PHYSICIAN ASSISTANT

## 2024-08-29 PROCEDURE — 96372 THER/PROPH/DIAG INJ SC/IM: CPT | Performed by: OBSTETRICS & GYNECOLOGY

## 2024-08-29 PROCEDURE — 2500000004 HC RX 250 GENERAL PHARMACY W/ HCPCS (ALT 636 FOR OP/ED): Performed by: PHYSICIAN ASSISTANT

## 2024-08-29 PROCEDURE — 81001 URINALYSIS AUTO W/SCOPE: CPT | Performed by: PHYSICIAN ASSISTANT

## 2024-08-29 PROCEDURE — 99291 CRITICAL CARE FIRST HOUR: CPT | Mod: CS,25 | Performed by: PHYSICIAN ASSISTANT

## 2024-08-29 PROCEDURE — 71045 X-RAY EXAM CHEST 1 VIEW: CPT | Performed by: RADIOLOGY

## 2024-08-29 PROCEDURE — 93005 ELECTROCARDIOGRAM TRACING: CPT

## 2024-08-29 PROCEDURE — 87635 SARS-COV-2 COVID-19 AMP PRB: CPT | Performed by: PHYSICIAN ASSISTANT

## 2024-08-29 PROCEDURE — 83735 ASSAY OF MAGNESIUM: CPT | Performed by: PHYSICIAN ASSISTANT

## 2024-08-29 PROCEDURE — 83690 ASSAY OF LIPASE: CPT | Performed by: PHYSICIAN ASSISTANT

## 2024-08-29 RX ORDER — METOCLOPRAMIDE 10 MG/1
10 TABLET ORAL EVERY 6 HOURS
Qty: 20 TABLET | Refills: 0 | Status: SHIPPED | OUTPATIENT
Start: 2024-08-29 | End: 2024-09-03

## 2024-08-29 RX ORDER — MEDROXYPROGESTERONE ACETATE 150 MG/ML
150 INJECTION, SUSPENSION INTRAMUSCULAR ONCE
Status: COMPLETED | OUTPATIENT
Start: 2024-08-29 | End: 2024-08-29

## 2024-08-29 RX ORDER — METOCLOPRAMIDE HYDROCHLORIDE 5 MG/ML
10 INJECTION INTRAMUSCULAR; INTRAVENOUS ONCE
Status: COMPLETED | OUTPATIENT
Start: 2024-08-29 | End: 2024-08-29

## 2024-08-29 RX ADMIN — SODIUM CHLORIDE 1000 ML: 9 INJECTION, SOLUTION INTRAVENOUS at 18:01

## 2024-08-29 RX ADMIN — SODIUM CHLORIDE, POTASSIUM CHLORIDE, SODIUM LACTATE AND CALCIUM CHLORIDE 1000 ML: 600; 310; 30; 20 INJECTION, SOLUTION INTRAVENOUS at 19:56

## 2024-08-29 RX ADMIN — METOCLOPRAMIDE 10 MG: 5 INJECTION, SOLUTION INTRAMUSCULAR; INTRAVENOUS at 18:01

## 2024-08-29 ASSESSMENT — PAIN - FUNCTIONAL ASSESSMENT: PAIN_FUNCTIONAL_ASSESSMENT: 0-10

## 2024-08-29 ASSESSMENT — COLUMBIA-SUICIDE SEVERITY RATING SCALE - C-SSRS
1. IN THE PAST MONTH, HAVE YOU WISHED YOU WERE DEAD OR WISHED YOU COULD GO TO SLEEP AND NOT WAKE UP?: NO
6. HAVE YOU EVER DONE ANYTHING, STARTED TO DO ANYTHING, OR PREPARED TO DO ANYTHING TO END YOUR LIFE?: NO
2. HAVE YOU ACTUALLY HAD ANY THOUGHTS OF KILLING YOURSELF?: NO

## 2024-08-29 ASSESSMENT — LIFESTYLE VARIABLES
HAVE PEOPLE ANNOYED YOU BY CRITICIZING YOUR DRINKING: NO
HAVE YOU EVER FELT YOU SHOULD CUT DOWN ON YOUR DRINKING: NO
EVER HAD A DRINK FIRST THING IN THE MORNING TO STEADY YOUR NERVES TO GET RID OF A HANGOVER: NO
TOTAL SCORE: 0
EVER FELT BAD OR GUILTY ABOUT YOUR DRINKING: NO

## 2024-08-29 ASSESSMENT — PAIN SCALES - GENERAL: PAINLEVEL_OUTOF10: 0 - NO PAIN

## 2024-08-29 NOTE — ED TRIAGE NOTES
"Patient c/o nausea, diarrhea and \"burping\" that started this morning. Patient denies abdominal pain.   "

## 2024-08-29 NOTE — PROGRESS NOTES
ISABEL IS HERE TODAY FOR HER REGULAR DEPO INJECTION    HER NEXT WINDOW FOR INJECTION WILL BE 11/14/2024 THRU 11/28/2024    INJECTION WAS GIVEN INTRAMUSCULARLY IN HER LEFT GLUTEAL AREA - PATIENT TOLERATED WELL.    NDC - 97802-747-42  LOT - [id number]  EXP - 03/31/2027

## 2024-08-30 LAB
ATRIAL RATE: 96 BPM
ATRIAL RATE: 97 BPM
HOLD SPECIMEN: NORMAL
P AXIS: 21 DEGREES
P AXIS: 64 DEGREES
P OFFSET: 197 MS
P OFFSET: 202 MS
P ONSET: 148 MS
P ONSET: 161 MS
PR INTERVAL: 120 MS
PR INTERVAL: 140 MS
Q ONSET: 218 MS
Q ONSET: 221 MS
QRS COUNT: 15 BEATS
QRS COUNT: 16 BEATS
QRS DURATION: 68 MS
QRS DURATION: 70 MS
QT INTERVAL: 346 MS
QT INTERVAL: 364 MS
QTC CALCULATION(BAZETT): 437 MS
QTC CALCULATION(BAZETT): 462 MS
QTC FREDERICIA: 404 MS
QTC FREDERICIA: 427 MS
R AXIS: 0 DEGREES
R AXIS: 56 DEGREES
T AXIS: -3 DEGREES
T AXIS: 44 DEGREES
T OFFSET: 391 MS
T OFFSET: 403 MS
VENTRICULAR RATE: 96 BPM
VENTRICULAR RATE: 97 BPM

## 2024-08-30 NOTE — ED PROVIDER NOTES
HPI   Chief Complaint   Patient presents with    Nausea       Is a 44-year-old female coming for increased nausea and vomiting after her increase of Mounjaro.  Patient states that when she would ambulate she would become nauseous and sweaty.  She denies any pain including the chest or abdomen.  She was having a hard time keeping down p.o. fluids or solids today.  Due to her symptoms she wanted come in to be evaluated.  She denies any cardiac history.  She has not had any hematemesis.  No fevers or chills.      History provided by:  Patient          Patient History   Past Medical History:   Diagnosis Date    Acute upper respiratory infection, unspecified 08/05/2019    Acute URI    Chronic ethmoidal sinusitis 07/15/2019    Ethmoid sinusitis    Chronic maxillary sinusitis 06/14/2019    Right maxillary sinusitis    Encounter for screening for malignant neoplasm of vagina     Vaginal Pap smear    Gastro-esophageal reflux disease without esophagitis 03/27/2019    Gastroesophageal reflux disease, esophagitis presence not specified    Lesion of ulnar nerve, unspecified upper limb 02/11/2014    Ulnar nerve impingement    Other conditions influencing health status     Menstruation    Otitis media, unspecified, right ear 03/14/2019    Right otitis media    Personal history of other (healed) physical injury and trauma 06/19/2014    History of sprain    Personal history of other diseases of the digestive system 07/16/2014    History of irritable bowel syndrome    Personal history of other diseases of the nervous system and sense organs 04/25/2014    History of carpal tunnel syndrome    Personal history of other diseases of the respiratory system 04/25/2014    Personal history of acute sinusitis    Personal history of other diseases of the respiratory system 06/04/2013    History of extrinsic asthma    Personal history of other diseases of the respiratory system 09/29/2020    History of nasal polyp    Personal history of other  diseases of the respiratory system 07/14/2015    History of acute pharyngitis    Personal history of other endocrine, nutritional and metabolic disease 07/16/2014    History of hypothyroidism    Personal history of other infectious and parasitic diseases 12/10/2018    History of candidiasis of mouth    Personal history of other specified conditions 12/14/2021    History of nasal congestion    Personal history of other specified conditions     History of snoring    Personal history of other specified conditions     History of heartburn    Unspecified injury of unspecified ankle, initial encounter 06/19/2014    Ankle injury    Unspecified otitis externa, right ear 03/14/2019    Right otitis externa    Unspecified otitis externa, unspecified ear 03/18/2019    Otitis externa     Past Surgical History:   Procedure Laterality Date    OTHER SURGICAL HISTORY  04/15/2014    Wrist Surgery    SINUS SURGERY  09/13/2013    Sinus Surgery    TOE SURGERY Right 12/2023     Family History   Problem Relation Name Age of Onset    Hypertension Mother      Hypertension Father      Asthma Father      Allergies Father       Social History     Tobacco Use    Smoking status: Former     Types: Cigarettes    Smokeless tobacco: Never   Substance Use Topics    Alcohol use: Not Currently     Comment: social    Drug use: Not Currently       Physical Exam   ED Triage Vitals [08/29/24 1729]   Temperature Heart Rate Respirations BP   36.3 °C (97.3 °F) (!) 102 20 (!) 124/92      Pulse Ox Temp Source Heart Rate Source Patient Position   98 % Temporal Monitor --      BP Location FiO2 (%)     -- --       Physical Exam  Vitals and nursing note reviewed.   Constitutional:       General: She is not in acute distress.     Appearance: Normal appearance. She is not toxic-appearing.   HENT:      Head: Normocephalic and atraumatic.      Nose: Nose normal.      Mouth/Throat:      Mouth: Mucous membranes are moist.      Pharynx: Oropharynx is clear.   Eyes:       Extraocular Movements: Extraocular movements intact.      Conjunctiva/sclera: Conjunctivae normal.      Pupils: Pupils are equal, round, and reactive to light.   Cardiovascular:      Rate and Rhythm: Normal rate and regular rhythm.      Pulses: Normal pulses.      Heart sounds: Normal heart sounds.   Pulmonary:      Effort: Pulmonary effort is normal. No respiratory distress.      Breath sounds: Normal breath sounds.   Abdominal:      General: Abdomen is flat. Bowel sounds are normal.      Palpations: Abdomen is soft.      Tenderness: There is no abdominal tenderness.      Comments: No abdominal pain to palpation.   Musculoskeletal:         General: Normal range of motion.      Cervical back: Normal range of motion and neck supple.   Skin:     General: Skin is warm and dry.      Coloration: Skin is not jaundiced or pale.      Findings: No bruising.   Neurological:      General: No focal deficit present.      Mental Status: She is alert and oriented to person, place, and time. Mental status is at baseline.   Psychiatric:         Mood and Affect: Mood normal.         Behavior: Behavior normal.           ED Course & MDM   ED Course as of 08/29/24 2033   Thu Aug 29, 2024   1948 EKG completed at 1916 shows on my interpretation normal sinus rhythm with a ventricular rate of 96 bpm.  No signs of STEMI.  NJ interval 140 ms with normal axis. [RS]      ED Course User Index  [RS] Juan Lin PA-C         Diagnoses as of 08/29/24 2033   Nausea and vomiting, unspecified vomiting type   Dehydration                 No data recorded     Leonore Coma Scale Score: 15 (08/29/24 1729 : Stef Freeman RN)                           Medical Decision Making  Summary:  Medical Decision Making:   Patient presented as described in HPI. Patient case including ROS, PE, and treatment and plan discussed with ED attending if attached as cosigner. Results from labs and or imaging included below if completed. Zoe Wallace  is a 44 y.o. coming  in for Patient presents with:  Nausea  .  Due to the patient's complaint of nausea as well as diaphoresis when she is standing and ambulating after increasing her dose to Mounjaro and workup is completed.  Patient denies chest pain or shortness of breath.  No abdominal pain.  Workup was completed and does show hypermagnesemia as well as increased H&H and white blood cell count slightly elevated.  She was given IV Reglan and states she feels much better.  Clinically as well as with lab workup.  Patient does seem dehydrated.  She is requiring 2 L of IV hydration and heart rate greatly improved.  She feels much better after interventions.  This is likely due to the increase in her medications.  She will be discharged on Reglan and advised talk to her doctor about decreasing the dose again as well as following up with her PCP.  She is agreeable for any return precautions that include development of chest pain, shortness of breath, abdominal pain or any other concerning symptoms.  She is able to keep down p.o. fluids given by myself.  Will be discharged after IV hydration.      Disposition is completed with shared decision making with the patient or guardian present with the patient. They were advised to follow up with PCP or recommended provider in 2-3 days for another evaluation and exam. I advised the patient to return or go to closest emergency room immediately if symptoms change, get worse, or new symptoms develop prior to follow up. I explained the plan and treatment course. Patient/guardian is in agreement with plan, treatment course, and follow up and state that they will comply.    Labs Reviewed  CBC WITH AUTO DIFFERENTIAL - Abnormal     WBC                           17.7 (*)               nRBC                          0.0                    RBC                           5.60 (*)               Hemoglobin                    17.3 (*)               Hematocrit                    52.5 (*)               MCV                            94                     MCH                           30.9                   MCHC                          33.0                   RDW                           13.2                   Platelets                     337                    Neutrophils %                 80.0                   Immature Granulocytes %, Automated   0.3                    Lymphocytes %                 10.8                   Monocytes %                   5.5                    Eosinophils %                 3.1                    Basophils %                   0.3                    Neutrophils Absolute          14.19 (*)               Immature Granulocytes Absolute, Au*   0.05                   Lymphocytes Absolute          1.91                   Monocytes Absolute            0.97                   Eosinophils Absolute          0.55                   Basophils Absolute            0.06                COMPREHENSIVE METABOLIC PANEL - Abnormal     Glucose                       102 (*)                Sodium                        141                    Potassium                     4.1                    Chloride                      107                    Bicarbonate                   18 (*)                 Anion Gap                     20                     Urea Nitrogen                 15                     Creatinine                    0.88                   eGFR                          83                     Calcium                       9.6                    Albumin                       4.7                    Alkaline Phosphatase          78                     Total Protein                 7.8                    AST                           18                     Bilirubin, Total              1.9 (*)                ALT                           23                  MAGNESIUM - Abnormal     Magnesium                     2.42 (*)            URINALYSIS WITH REFLEX CULTURE AND MICROSCOPIC - Abnormal     Color, Urine                   Yellow                 Appearance, Urine             Clear                  Specific Gravity, Urine       1.031                  pH, Urine                     5.5                    Protein, Urine                20 (TRACE)                Glucose, Urine                Normal                 Blood, Urine                  NEGATIVE                Ketones, Urine                20 (1+) (*)               Bilirubin, Urine              NEGATIVE                Urobilinogen, Urine           Normal                 Nitrite, Urine                NEGATIVE                Leukocyte Esterase, Urine     NEGATIVE             SARS-COV-2 PCR - Normal     Coronavirus 2019, PCR                                  Narrative: This assay has received FDA Emergency Use Authorization (EUA) and is only authorized for the duration of time that circumstances exist to justify the authorization of the emergency use of in vitro diagnostic tests for the detection of SARS-CoV-2 virus and/or diagnosis of COVID-19 infection under section 564(b)(1) of the Act, 21 U.S.C. 360bbb-3(b)(1). This assay is an in vitro diagnostic nucleic acid amplification test for the qualitative detection of SARS-CoV-2 from nasopharyngeal specimens and has been validated for use at [facility]. Negative results do not preclude COVID-19 infections and should not be used as the sole basis for diagnosis, treatment, or other management decisions.                  HUMAN CHORIONIC GONADOTROPIN, SERUM QUANTITATIVE - Normal     HCG, Beta-Quantitative        <2                       Narrative:  Total HCG measurement is performed using the [employer] Access                 Immunoassay which detects intact HCG and free beta HCG subunit.                  This test is not indicated for use as a tumor marker.                 HCG testing is performed using a different test methodology at [facility] than other [facility].  Direct result comparison                 should only be made within the same method.                    SERIAL TROPONIN-INITIAL - Normal     Troponin I, High Sensitivity   3                        Narrative: Less than 99th percentile of normal range cutoff-                Female and children under 18 years old <14 ng/L; Male <21 ng/L: Negative                Repeat testing should be performed if clinically indicated.                                 Female and children under 18 years old 14-50 ng/L; Male 21-50 ng/L:                Consistent with possible cardiac damage and possible increased clinical                 risk. Serial measurements may help to assess extent of myocardial damage.                                 >50 ng/L: Consistent with cardiac damage, increased clinical risk and                myocardial infarction. Serial measurements may help assess extent of                 myocardial damage.                                  NOTE: Children less than 1 year old may have higher baseline troponin                 levels and results should be interpreted in conjunction with the overall                 clinical context.                                 NOTE: Troponin I testing is performed using a different                 testing methodology at [facility] than at other                 [facility]. Direct result comparisons should only                 be made within the same method.  SERIAL TROPONIN, 1 HOUR - Normal     Troponin I, High Sensitivity   3                        Narrative: Less than 99th percentile of normal range cutoff-                Female and children under 18 years old <14 ng/L; Male <21 ng/L: Negative                Repeat testing should be performed if clinically indicated.                                 Female and children under 18 years old 14-50 ng/L; Male 21-50 ng/L:                Consistent with possible cardiac damage and possible increased clinical                  risk. Serial measurements may help to assess extent of myocardial damage.                                 >50 ng/L: Consistent with cardiac damage, increased clinical risk and                myocardial infarction. Serial measurements may help assess extent of                 myocardial damage.                                  NOTE: Children less than 1 year old may have higher baseline troponin                 levels and results should be interpreted in conjunction with the overall                 clinical context.                                 NOTE: Troponin I testing is performed using a different                 testing methodology at [facility] than at other                 [facility]. Direct result comparisons should only                 be made within the same method.  LIPASE - Normal     Lipase                        18                       Narrative: Venipuncture immediately after or during the administration of Metamizole may lead to falsely low results. Testing should be performed immediately prior to Metamizole dosing.  TROPONIN SERIES- (INITIAL, 1 HR)       Narrative: The following orders were created for panel order Troponin I Series, High Sensitivity (0, 1 HR).                Procedure                               Abnormality         Status                                   ---------                               -----------         ------                                   Troponin I, High Sensiti...[[id number]]  Normal              Final result                             Troponin, High Sensitivi...[[id number]]  Normal              Final result                                             Please view results for these tests on the individual orders.  URINALYSIS WITH REFLEX CULTURE AND MICROSCOPIC       Narrative: The following orders were created for panel order Urinalysis with Reflex Culture and Microscopic.                Procedure                               Abnormality          Status                                   ---------                               -----------         ------                                   Urinalysis with Reflex C...[[id number]]  Abnormal            Final result                             Extra Urine Gray Tube[[id number]]                            In process                                               Please view results for these tests on the individual orders.  EXTRA URINE GRAY TUBE  URINALYSIS MICROSCOPIC WITH REFLEX CULTURE   XR chest 1 view   Final Result    No acute cardiopulmonary process.          MACRO:    None          Signed by: Chayo Manuel 8/29/2024 6:05 PM    Dictation workstation:   [id number]                            Tests/Medications/Escalations of Care considered but not given: As in [facility]    Patient care discussed with: N/A  Social Determinants affecting care: N/A    Final diagnosis and disposition as documented     ED Course as of 08/29/24 2038  ------------------------------------------------------------  Time: 08/29 1948  Comment: EKG completed at 1916 shows on my interpretation normal sinus rhythm with a ventricular rate of 96 bpm.  No signs of STEMI.  NE interval 140 ms with normal axis.  By: Juan Lin PA-C    ------------------------------------------------------------  Diagnoses as of 08/29/24 2038  Nausea and vomiting, unspecified vomiting type  Dehydration       Shared decision making was completed and determined that patient will be discharged. I discussed the differential; results and discharge plan with the patient and/or family/friend/caregiver if present.  I emphasized the importance of follow-up with the physician I referred them to in the timeframe recommended.  I explained reasons for the patient to return to the Emergency Department. They agreed that if they feel their condition is worsening or if they have any other concern they should call 911 immediately for further assistance. I gave the patient an  opportunity to ask all questions they had and answered all of them accordingly. They understand return precautions and discharge instructions. The patient and/or family/friend/caregiver expressed understanding verbally and that they would comply.     Disposition: Discharge      This note has been transcribed using voice recognition and may contain grammatical errors, misplaced words, incorrect words, incorrect phrases or other errors.         Procedure  Critical Care    Performed by: Juan Lin PA-C  Authorized by: Juan Lin PA-C    Critical care provider statement:     Critical care time (minutes):  30    Critical care time was exclusive of:  Separately billable procedures and treating other patients and teaching time    Critical care was necessary to treat or prevent imminent or life-threatening deterioration of the following conditions:  Dehydration    Critical care was time spent personally by me on the following activities:  Ordering and review of laboratory studies, ordering and review of radiographic studies, ordering and performing treatments and interventions, re-evaluation of patient's condition and development of treatment plan with patient or surrogate       Juan Lin PA-C  08/29/24 2040

## 2024-09-04 DIAGNOSIS — G43.009 MIGRAINE WITHOUT AURA, NOT REFRACTORY: Primary | ICD-10-CM

## 2024-09-04 LAB
ATRIAL RATE: 96 BPM
ATRIAL RATE: 97 BPM
P AXIS: 21 DEGREES
P AXIS: 64 DEGREES
P OFFSET: 197 MS
P OFFSET: 202 MS
P ONSET: 148 MS
P ONSET: 161 MS
PR INTERVAL: 120 MS
PR INTERVAL: 140 MS
Q ONSET: 218 MS
Q ONSET: 221 MS
QRS COUNT: 15 BEATS
QRS COUNT: 16 BEATS
QRS DURATION: 68 MS
QRS DURATION: 70 MS
QT INTERVAL: 346 MS
QT INTERVAL: 364 MS
QTC CALCULATION(BAZETT): 437 MS
QTC CALCULATION(BAZETT): 462 MS
QTC FREDERICIA: 404 MS
QTC FREDERICIA: 427 MS
R AXIS: 0 DEGREES
R AXIS: 56 DEGREES
T AXIS: -3 DEGREES
T AXIS: 44 DEGREES
T OFFSET: 391 MS
T OFFSET: 403 MS
VENTRICULAR RATE: 96 BPM
VENTRICULAR RATE: 97 BPM

## 2024-09-04 RX ORDER — DULOXETIN HYDROCHLORIDE 30 MG/1
30 CAPSULE, DELAYED RELEASE ORAL DAILY
Qty: 30 CAPSULE | Refills: 5 | Status: SHIPPED | OUTPATIENT
Start: 2024-09-04 | End: 2025-09-04

## 2024-09-16 ENCOUNTER — TELEPHONE (OUTPATIENT)
Dept: OBSTETRICS AND GYNECOLOGY | Facility: CLINIC | Age: 44
End: 2024-09-16
Payer: COMMERCIAL

## 2024-09-19 ENCOUNTER — OFFICE VISIT (OUTPATIENT)
Dept: OBSTETRICS AND GYNECOLOGY | Facility: CLINIC | Age: 44
End: 2024-09-19
Payer: COMMERCIAL

## 2024-09-19 VITALS
DIASTOLIC BLOOD PRESSURE: 80 MMHG | BODY MASS INDEX: 32.14 KG/M2 | HEIGHT: 66 IN | WEIGHT: 200 LBS | SYSTOLIC BLOOD PRESSURE: 120 MMHG

## 2024-09-19 DIAGNOSIS — Z30.42 ENCOUNTER FOR DEPO-PROVERA CONTRACEPTION: Primary | ICD-10-CM

## 2024-09-19 DIAGNOSIS — Z12.4 CERVICAL CANCER SCREENING: ICD-10-CM

## 2024-09-19 DIAGNOSIS — Z01.419 WELL WOMAN EXAM WITH ROUTINE GYNECOLOGICAL EXAM: ICD-10-CM

## 2024-09-19 PROCEDURE — 1036F TOBACCO NON-USER: CPT | Performed by: OBSTETRICS & GYNECOLOGY

## 2024-09-19 PROCEDURE — 3079F DIAST BP 80-89 MM HG: CPT | Performed by: OBSTETRICS & GYNECOLOGY

## 2024-09-19 PROCEDURE — 3074F SYST BP LT 130 MM HG: CPT | Performed by: OBSTETRICS & GYNECOLOGY

## 2024-09-19 PROCEDURE — 3008F BODY MASS INDEX DOCD: CPT | Performed by: OBSTETRICS & GYNECOLOGY

## 2024-09-19 PROCEDURE — 99396 PREV VISIT EST AGE 40-64: CPT | Performed by: OBSTETRICS & GYNECOLOGY

## 2024-09-19 RX ORDER — MEDROXYPROGESTERONE ACETATE 150 MG/ML
150 INJECTION, SUSPENSION INTRAMUSCULAR
Qty: 1 ML | Refills: 3 | Status: SHIPPED | OUTPATIENT
Start: 2024-09-19

## 2024-09-19 NOTE — PROGRESS NOTES
Subjective   Patient ID: Zoe Wallace is a 44 y.o. female who presents for Annual Exam.    PAP 8-29-19 NEG HPV NEG  MAMMO 2/23/23  DEXA 1/14/21  COLON 8-12-24 good for 10 years  Gardasil never        On Depo provera about 10 years to eliminate periods and helps with vulvar itching. Taking c a and vit D.  Last Depo 8/29/24. Little spotting in August when close to the shot.     Right buttock mole has been biopsied. Junctional nevus. No longer seen.     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 Not sexually active.     Mom passed [place]      first cousin on dads side breast ca 35-36. Not sure about genetics.     Has a PCP, Dr. Choi, getting physical next month.      Review of Systems   All other systems reviewed and are negative.      Objective   Constitutional: Alert and in no acute distress. Well developed, well nourished.  Neck: no neck asymmetry. Supple and thyroid not enlarged and there were no palpable thyroid nodules.  Chest: Breasts normal appearance, no nipple discharge and no skin changes and palpation of breasts and axillae: no palpable mass and no axillary lymphadenopathy.  Abdomen: soft nontender; no abdominal mass palpated, no organomegaly and no hernias.  Genitourinary: external genitalia: normal, no inguinal lymphadenopathy, Bartholin's urethral and El Veintiseis's glands: normal, urethra: normal and bladder: normal on palpation.  Vagina: normal.  Cervix: normal.  Uterus: normal.   Right adnexa/parametria: normal.  Left adnexa/parametria: normal.  Skin: normal skin color and pigmentation, normal skin turgor and no rash.  Psychiatric: alert and oriented x 3, affect normal to patient baseline and mood appropriate.     Assessment/Plan   -Patient willing to get Gardasil. Will get approval.  -Pap-HPV  -Having mammogram on Tuesday.

## 2024-09-20 LAB
C TRACH RRNA SPEC QL NAA+PROBE: NEGATIVE
N GONORRHOEA DNA SPEC QL PROBE+SIG AMP: NEGATIVE

## 2024-09-24 ENCOUNTER — OFFICE VISIT (OUTPATIENT)
Dept: PRIMARY CARE | Facility: CLINIC | Age: 44
End: 2024-09-24
Payer: COMMERCIAL

## 2024-09-24 ENCOUNTER — HOSPITAL ENCOUNTER (OUTPATIENT)
Dept: RADIOLOGY | Facility: HOSPITAL | Age: 44
Discharge: HOME | End: 2024-09-24
Payer: COMMERCIAL

## 2024-09-24 VITALS
BODY MASS INDEX: 32.6 KG/M2 | HEART RATE: 90 BPM | DIASTOLIC BLOOD PRESSURE: 86 MMHG | WEIGHT: 202 LBS | OXYGEN SATURATION: 97 % | SYSTOLIC BLOOD PRESSURE: 136 MMHG

## 2024-09-24 VITALS — HEIGHT: 66 IN | WEIGHT: 200 LBS | BODY MASS INDEX: 32.14 KG/M2

## 2024-09-24 DIAGNOSIS — E66.9 OBESITY (BMI 30-39.9): Primary | ICD-10-CM

## 2024-09-24 DIAGNOSIS — Z23 ENCOUNTER FOR VACCINATION: ICD-10-CM

## 2024-09-24 DIAGNOSIS — Z12.31 SCREENING MAMMOGRAM FOR BREAST CANCER: ICD-10-CM

## 2024-09-24 PROCEDURE — 77067 SCR MAMMO BI INCL CAD: CPT

## 2024-09-24 PROCEDURE — 77067 SCR MAMMO BI INCL CAD: CPT | Performed by: RADIOLOGY

## 2024-09-24 PROCEDURE — 77063 BREAST TOMOSYNTHESIS BI: CPT | Performed by: RADIOLOGY

## 2024-09-24 RX ORDER — SEMAGLUTIDE 0.25 MG/.5ML
0.25 INJECTION, SOLUTION SUBCUTANEOUS WEEKLY
Qty: 2 ML | Refills: 0 | Status: SHIPPED | OUTPATIENT
Start: 2024-09-24 | End: 2024-10-16

## 2024-09-24 ASSESSMENT — LIFESTYLE VARIABLES
AUDIT-C TOTAL SCORE: 0
HOW OFTEN DO YOU HAVE SIX OR MORE DRINKS ON ONE OCCASION: NEVER
SKIP TO QUESTIONS 9-10: 1
HOW OFTEN DO YOU HAVE A DRINK CONTAINING ALCOHOL: NEVER
HOW MANY STANDARD DRINKS CONTAINING ALCOHOL DO YOU HAVE ON A TYPICAL DAY: PATIENT DOES NOT DRINK

## 2024-09-24 ASSESSMENT — PATIENT HEALTH QUESTIONNAIRE - PHQ9
SUM OF ALL RESPONSES TO PHQ9 QUESTIONS 1 & 2: 0
1. LITTLE INTEREST OR PLEASURE IN DOING THINGS: NOT AT ALL
2. FEELING DOWN, DEPRESSED OR HOPELESS: NOT AT ALL

## 2024-09-24 ASSESSMENT — ENCOUNTER SYMPTOMS
DEPRESSION: 0
LOSS OF SENSATION IN FEET: 0
OCCASIONAL FEELINGS OF UNSTEADINESS: 0

## 2024-09-24 ASSESSMENT — PAIN SCALES - GENERAL: PAINLEVEL: 0-NO PAIN

## 2024-09-24 NOTE — PATIENT INSTRUCTIONS
Here for ER follow up.  We will use wegovy for weight loss - we will titrate slow.  Continue with dietary changes, exercise.  If unable to tolerate please call.  Other option is zepbound.     Follow up as scheduled - blood work 1 week prior.

## 2024-09-24 NOTE — PROGRESS NOTES
Subjective   Patient ID: Zoe Wallace is a 44 y.o. female who presents for ER F/U.    Pt is here for ER follow up.     [facility] to [facility] - pt was getting ozempic.  Pt had dose increased.  Pt became very nauseated, belching.  No benefit with zofran.  Patient went to ER - pt had IVF and higher dose of zofran.  Pt was also given reglan.   Since ER - appetite is improved.  Nausea resolved.      Pt was on ozempic - pt lose 10 pounds.      Pt would like to use wegovy.           Review of Systems    Objective   /86 (BP Location: Right arm, Patient Position: Sitting)   Pulse 90   Wt 91.6 kg (202 lb)   SpO2 97%   BMI 32.60 kg/m²     Physical Exam  Vitals reviewed.   Constitutional:       General: She is not in acute distress.  Cardiovascular:      Rate and Rhythm: Normal rate and regular rhythm.   Pulmonary:      Effort: Pulmonary effort is normal.      Breath sounds: No wheezing or rhonchi.   Musculoskeletal:      Right lower leg: No edema.      Left lower leg: No edema.   Lymphadenopathy:      Cervical: No cervical adenopathy.   Neurological:      Mental Status: She is alert.         Assessment/Plan   Diagnoses and all orders for this visit:  Obesity (BMI 30-39.9)  -     semaglutide, weight loss, (Wegovy) 0.25 mg/0.5 mL pen injector; Inject 0.25 mg under the skin 1 (one) time per week for 4 doses.  Encounter for vaccination  -     Flu vaccine, trivalent, preservative free, no egg protein, age 18y+ (Flublok)    Patient Instructions   Here for ER follow up.  We will use wegovy for weight loss - we will titrate slow.  Continue with dietary changes, exercise.  If unable to tolerate please call.  Other option is zepbound.     Follow up as scheduled - blood work 1 week prior.

## 2024-09-25 ENCOUNTER — TELEPHONE (OUTPATIENT)
Dept: OBSTETRICS AND GYNECOLOGY | Facility: CLINIC | Age: 44
End: 2024-09-25
Payer: COMMERCIAL

## 2024-09-26 ENCOUNTER — TELEMEDICINE (OUTPATIENT)
Dept: NEUROLOGY | Facility: HOSPITAL | Age: 44
End: 2024-09-26
Payer: COMMERCIAL

## 2024-09-26 DIAGNOSIS — G43.001 MIGRAINE WITHOUT AURA AND WITH STATUS MIGRAINOSUS, NOT INTRACTABLE: Primary | ICD-10-CM

## 2024-09-26 DIAGNOSIS — R92.8 ABNORMAL MAMMOGRAM: Primary | ICD-10-CM

## 2024-09-26 DIAGNOSIS — G43.009 MIGRAINE WITHOUT AURA, NOT REFRACTORY: ICD-10-CM

## 2024-09-26 PROCEDURE — 99214 OFFICE O/P EST MOD 30 MIN: CPT | Mod: GT,GC,95 | Performed by: PSYCHIATRY & NEUROLOGY

## 2024-09-26 PROCEDURE — 99214 OFFICE O/P EST MOD 30 MIN: CPT | Performed by: PSYCHIATRY & NEUROLOGY

## 2024-09-26 RX ORDER — SUMATRIPTAN 50 MG/1
50 TABLET, FILM COATED ORAL ONCE AS NEEDED
Qty: 9 TABLET | Refills: 5 | Status: SHIPPED | OUTPATIENT
Start: 2024-09-26 | End: 2025-03-25

## 2024-09-26 NOTE — PROGRESS NOTES
Consent obtained from pt:This is a telehealth visit which has the benefit of avoiding travel and limiting exposure to illness.  Telehealth visits are not recorded and your personal health information is protected.  This visit will be billed to your insurance.  There are limitations to my ability to examine you and it is possible that I may decide you need an in person appointment.  Is it ok to continue?    Assessment and Plan:  Migraine with visual aura: MRI nonspecific WM changes only. On topiramate 25mg at bedtime and nurtec ODT every other day, rizatriptan for acute abortive. Frequency now reduced to 4HA / month. Not tolerating increased dose of topiramate, will continue 25mg in the evening. Migraines increased in duration, will change from Rizatriptan to Sumatriptan as abortive therapy. Continue Phenergan (at night at home) and Zofran (at work) for nausea. Follow-up in 1 month.    Patient seen, evaluated and discussed with attending physician, Dr. Chacon.    Jose Rafael Petersen,   PGY-2 Neurology    Orders:  Sumatriptan 50mg PRN    HPI:   Zoe Wallace is a 44 y.o. right handed female with hx of migraines who follows up virtually for her headaches.    HA started at age 33yo (or younger). Gradually worsening in frequency and severity in the past several months. Lasts 6 hours in duration, pounding and sharp, located in the back of the neck, travels forward to behind the eyes. T has to take a break / rest in room with ice packs over eyes and neck when she gets headaches. +Nausea, photophobia and vomiting, worsens with exertion. Affecting her work.     Triggers: barometric pressure, stress, dehydration. Certain sleep positions (neck strain), sinusitis / allergy brings on the migraines.     Was on propranolol before, not effective. Nurtec and rizatriptan for abortive which has been effective.     Current Acute Headache Treatment Rizatriptan   Current Preventative Headache Treatment Topiramate, Nurtec every other  day   Previous Acute Headache Treatment Nurtec (Rimegepant) PRN  Rizatriptan   Previous Preventative Headache Treatment Propranolol        Pt was seen on 5/10/2024. Started on low dose topiramate 25mg at bedtime to ensure pt tolerates it. Zofran for nausea at home because it makes her sleepy, promethazine for nausea at work to avoid somnolence. Pt was last seen 6/13, here migraines were reduced to 6/month with topiramate, dose was increased to 25mg BID, in addition to Nurtec every other day and rizatriptan as abortive. MRI was done at prior visist with nonspecific white matter changes.    Interval Hx:    Taking longer to get rid of the headaches, 2-3 days, needs 2 doses to resolve, not needing 3rd dose, has been on Rizatriptan 5-7 years and has typically been effective.    Having 4 migraines/month over last 2-3 months, lasting 2-3 days. First dose 9/10 to 7/10, Second dose to a 3/10.    Not tolerating the increased dose of Topamax, makes her tired, only taking Topamax 25 in the evening, tingling in the feet intermittently. Otherwise tolerated Nurtec without issues.    Current Outpatient Medications on File Prior to Visit   Medication Sig Dispense Refill    albuterol 90 mcg/actuation inhaler Inhale 2 puffs 4 times a day as needed.      amLODIPine (Norvasc) 5 mg tablet Take 1 tablet (5 mg) by mouth once daily. 30 tablet 0    ascorbic acid (Vitamin C) 500 mg tablet Take 1 tablet (500 mg) by mouth.      budesonide (Pulmicort) 1 mg/2 mL nebulizer solution       calcium carbonate (Oscal) 500 mg calcium (1,250 mg) tablet Take 1 tablet (1,250 mg) by mouth 2 times daily (morning and late afternoon).      cetirizine (ZyrTEC) 10 mg tablet Take 1 tablet (10 mg) by mouth once daily. FOR 90 DAYS for 90      cholecalciferol (Vitamin D-3) 50 MCG (2000 UT) tablet Take 1 tablet (2,000 Units) by mouth once daily.      clobetasol (Temovate) 0.05 % ointment Apply topically once daily.  APPLY SPARINGLY TO AFFECTED AREA 30 g 0     cyanocobalamin (Vitamin B-12) 100 mcg tablet Take by mouth.      diazePAM (Valium) 5 mg tablet Take 1 tablet (5 mg) by mouth 2 times a day for 2 doses. Take one pill 30 min prior to MRI and second pill as needed to complete MRI 2 tablet 0    DULoxetine (Cymbalta) 30 mg DR capsule Take 1 capsule (30 mg) by mouth once daily. Do not crush or chew. 30 capsule 5    famotidine (Pepcid) 40 mg tablet TAKE 1 TABLET BY MOUTH 2 TIMES A  tablet 1    fluticasone (Flonase) 50 mcg/actuation nasal spray Administer 1 spray into each nostril once daily. Shake gently. Before first use, prime pump. After use, clean tip and replace cap. 48 g 1    hydroCHLOROthiazide (HYDRODiuril) 12.5 mg tablet Take 1 tablet (12.5 mg) by mouth once daily. For 90 days      losartan (Cozaar) 100 mg tablet Take 1 tablet (100 mg) by mouth once daily.      magnesium carb,citrate,oxide (MAGNESIUM COMPLEX ORAL) Take 500 mg by mouth once daily.      medroxyPROGESTERone 150 mg/mL injection Inject 1 mL (150 mg) into the muscle every 12 weeks. 1 mL 3    metoclopramide (Reglan) 10 mg tablet Take 1 tablet (10 mg) by mouth every 6 hours for 5 days. 20 tablet 0    montelukast (Singulair) 10 mg tablet Take 1 tablet (10 mg) by mouth once daily.      Nurtec ODT 75 mg tablet,disintegrating DISSOLVE 1 TABLET BY MOUTH EVERY OTHER DAY. ON THE TONGUE AND ALLOW TO DISSOLVE WITH MILD TO MODERATE HEADACHE 16 tablet 2    ondansetron (Zofran) 4 mg tablet Take 1 tablet (4 mg) by mouth every 8 hours if needed for nausea or vomiting for up to 40 doses. 20 tablet 1    rizatriptan MLT (Maxalt-MLT) 10 mg disintegrating tablet DISSOLVE 1 TABLET (10 MG) BY MOUTH ONCE DAILY. WITH ONSET OF MODERATE TO SEVERE HEADACHE MAY REPEAT DOSE IN 2 HOURS IF NEEDED. NO MORE THAN 2 DOSES IN 24 HOUR PERIOD 9 tablet 2    rizatriptan MLT (Maxalt-MLT) 10 mg disintegrating tablet Take 1 tablet (10 mg) by mouth once daily. with onset of moderate to severe headache may repeat dose in 2 hours if needed.  no more than 2 doses in 24 hour period Orally 9 tablet 2    semaglutide, weight loss, (Wegovy) 0.25 mg/0.5 mL pen injector Inject 0.25 mg under the skin 1 (one) time per week for 4 doses. 2 mL 0    sod chloride-sodium bicarb (Nasal Wash) nasal rinse       topiramate (Topamax) 25 mg tablet Take 2 tablets (50 mg) by mouth once daily. 180 tablet 3    triamcinolone (Nasacort) 55 mcg nasal inhaler Administer 2 sprays into affected nostril(s) once daily.      triamcinolone acetonide 0.05 % ointment Apply topically 2 times a day.  APPLY TO AFFECTED AREA EXTERNALLY      zinc gluconate 50 mg tablet Take 1 tablet (50 mg) by mouth once daily.      [DISCONTINUED] medroxyPROGESTERone 150 mg/mL injection INJECT INTRAMUSCULARLY EVERY 12 WEEKS AS DIRECTED 1 mL 0     No current facility-administered medications on file prior to visit.        Patient Active Problem List   Diagnosis    HTN (hypertension)    Allergic rhinosinusitis    DUB (dysfunctional uterine bleeding)    Fatty infiltration of liver    Gastroesophageal reflux disease without esophagitis    Generalized anxiety disorder    Mannose-binding lectin deficiency (Multi)    Migraine without aura, not refractory    Mild intermittent asthma ([facility]-HCC)    NIKOLAI (obstructive sleep apnea)    Primary insomnia    Seasonal and perennial allergic rhinoconjunctivitis of both eyes    Temporomandibular joint pain dysfunction syndrome    Vitamin D deficiency        Past Surgical History:   Procedure Laterality Date    OTHER SURGICAL HISTORY  04/15/2014    Wrist Surgery    SINUS SURGERY  09/13/2013    Sinus Surgery    TOE SURGERY Right 12/2023       Allergies   Allergen Reactions    Amoxicillin-Pot Clavulanate Other, Diarrhea, Nausea Only and Unknown     GI: Nausea/Vomiting    Cat Dander Unknown    Doxycycline Hives    House Dust Unknown    House Dust Mite Unknown    Mold Unknown    Moxifloxacin Hives, Other and Swelling     bumps on tongue    Pollen Extracts Other    Ragweed Unknown     Sulfamethoxazole-Trimethoprim Hives, Swelling and Other     SWELLING OF LIPS; Facial Swelling    Tree And Shrub Pollen Unknown    Clarithromycin Itching and Rash        Family History   Problem Relation Name Age of Onset    Hypertension Mother      Hypertension Father      Asthma Father      Allergies Father          Social History     Tobacco Use    Smoking status: Former     Types: Cigarettes    Smokeless tobacco: Never   Vaping Use    Vaping status: Never Used   Substance Use Topics    Alcohol use: Not Currently    Drug use: Not Currently       ROS:    General: No symptoms reported      Objective:    There were no vitals taken for this visit. This was a virtual visit.     Neurologic Exam (limited d/t virtual visit)  Awake, alert, language grossly normal, following commands, EOMI, face symmetric, no drift in all 4 extremities, no orbiting, sensation intact to light touch b/l, FNF intact    Extensive review of notes in EMR, labs, tests, Interpretation of neuroimaging as documented in the HPI or Assessment and Plan.     MRI imaging from today reviewed as below.   No MRI head results found for the past 14 days

## 2024-09-26 NOTE — PATIENT INSTRUCTIONS
Keep a calendar of your migraines.    Wear hair loose.      Exercise regularly, get plenty of fresh air and regular, sleep.    Preventative medication should be taken every day regardless of whether or not, you have a headache.  It is to prevent headaches.    Medication to treat the actual headache should be taken as soon as you realize you have a headache.    Medications such as Maxalt, Imitrex, Relpax, Zomig, can be taken only twice a day and they're only nine pills per month.  If you have frequent headaches may need to save these for the worst headaches.    Do not take pain medication every day.  Your body can become addicted to this medication and may make the headaches worse.  If you are having headaches every day, we need to increase the preventative medication, call me if this is becoming a problem.    Avoid foods that can cause migraines.  These include: Red wine, MSG, nitrates (hot dogs, or lunch meats), caffeine, chocolate, cheese.    Monosodium Glutamate (MSG)  MSG also known as Monosodium glutamate is a common food additive. MSG is used to make food taste better, and can be found in processed products, fast food, and canned soups. MSG can cause headaches when consumed in large amounts.    Fast Foods that Contain MSG  Chick[employer]hang[employer]A's Chicken Wrens   [employer]'s Extra Crispy Chicken Breast    Chips  (Will be listed on ingredient list Monosodium Glutamate)  Doritos  Pringles  Potato Chips    Meats  Hot dogs    Lunch Meats  Beef jerky    Sausages  Smoked meats    Pepperoni  Meat snack sticks    Zavala  Patrasmi    Hamburgers  Cold cuts    Salami  Fried Chicken      Chicken Nuggets  Burgers    Sauces  Ketchup    Mayonnaise  Barbecue sauce   Salad dressing  Soy sauce    Mustard    Miscellaneous  Bodybuilding protein powder  Instant Ramen  Spices    Items that cause headaches without MSG  Alcohol (red wine, beer, whiskey, Scotch, and champagne )  Cheese (Aged Cheeses: including Parmesan, Swiss,  Sammy Guerrero    I can be reached at phone: [phone] or email: [email or web address] or message me on [employer]

## 2024-09-30 ENCOUNTER — PATIENT MESSAGE (OUTPATIENT)
Dept: PRIMARY CARE | Facility: CLINIC | Age: 44
End: 2024-09-30
Payer: COMMERCIAL

## 2024-09-30 DIAGNOSIS — E66.9 OBESITY (BMI 30-39.9): ICD-10-CM

## 2024-10-01 PROCEDURE — RXMED WILLOW AMBULATORY MEDICATION CHARGE

## 2024-10-01 RX ORDER — SEMAGLUTIDE 0.25 MG/.5ML
0.25 INJECTION, SOLUTION SUBCUTANEOUS WEEKLY
Qty: 2 ML | Refills: 0 | Status: SHIPPED | OUTPATIENT
Start: 2024-10-01 | End: 2024-10-30

## 2024-10-02 ENCOUNTER — PHARMACY VISIT (OUTPATIENT)
Dept: PHARMACY | Facility: CLINIC | Age: 44
End: 2024-10-02
Payer: COMMERCIAL

## 2024-10-08 ENCOUNTER — APPOINTMENT (OUTPATIENT)
Dept: OBSTETRICS AND GYNECOLOGY | Facility: CLINIC | Age: 44
End: 2024-10-08
Payer: COMMERCIAL

## 2024-10-08 ENCOUNTER — HOSPITAL ENCOUNTER (OUTPATIENT)
Dept: RADIOLOGY | Facility: HOSPITAL | Age: 44
Discharge: HOME | End: 2024-10-08
Payer: COMMERCIAL

## 2024-10-08 ENCOUNTER — APPOINTMENT (OUTPATIENT)
Dept: RADIOLOGY | Facility: HOSPITAL | Age: 44
End: 2024-10-08
Payer: COMMERCIAL

## 2024-10-08 DIAGNOSIS — R92.8 ABNORMAL MAMMOGRAM: ICD-10-CM

## 2024-10-08 LAB
CYTOLOGY CMNT CVX/VAG CYTO-IMP: NORMAL
HPV HR 12 DNA GENITAL QL NAA+PROBE: NEGATIVE
HPV HR GENOTYPES PNL CVX NAA+PROBE: NEGATIVE
HPV16 DNA SPEC QL NAA+PROBE: NEGATIVE
HPV18 DNA SPEC QL NAA+PROBE: NEGATIVE
LAB AP HPV GENOTYPE QUESTION: YES
LAB AP HPV HR: NORMAL
LAB AP PAP ADDITIONAL TESTS: NORMAL
LABORATORY COMMENT REPORT: NORMAL
PATH REPORT.TOTAL CANCER: NORMAL

## 2024-10-08 PROCEDURE — 77065 DX MAMMO INCL CAD UNI: CPT | Mod: RIGHT SIDE | Performed by: RADIOLOGY

## 2024-10-08 PROCEDURE — 77065 DX MAMMO INCL CAD UNI: CPT | Mod: RT

## 2024-10-15 ENCOUNTER — APPOINTMENT (OUTPATIENT)
Dept: OBSTETRICS AND GYNECOLOGY | Facility: CLINIC | Age: 44
End: 2024-10-15
Payer: COMMERCIAL

## 2024-10-15 DIAGNOSIS — R92.8 ABNORMAL MAMMOGRAM: Primary | ICD-10-CM

## 2024-10-19 ENCOUNTER — LAB (OUTPATIENT)
Dept: LAB | Facility: LAB | Age: 44
End: 2024-10-19
Payer: COMMERCIAL

## 2024-10-19 DIAGNOSIS — R73.09 ELEVATED GLUCOSE: ICD-10-CM

## 2024-10-19 DIAGNOSIS — E55.9 VITAMIN D DEFICIENCY: ICD-10-CM

## 2024-10-19 DIAGNOSIS — I10 PRIMARY HYPERTENSION: ICD-10-CM

## 2024-10-19 PROCEDURE — 82306 VITAMIN D 25 HYDROXY: CPT

## 2024-10-19 PROCEDURE — 83036 HEMOGLOBIN GLYCOSYLATED A1C: CPT

## 2024-10-21 LAB
25(OH)D3 SERPL-MCNC: 32 NG/ML (ref 30–100)
ALBUMIN SERPL BCP-MCNC: 4.3 G/DL (ref 3.4–5)
ALP SERPL-CCNC: 75 U/L (ref 33–110)
ALT SERPL W P-5'-P-CCNC: 21 U/L (ref 7–45)
ANION GAP SERPL CALC-SCNC: 11 MMOL/L (ref 10–20)
AST SERPL W P-5'-P-CCNC: 19 U/L (ref 9–39)
BASOPHILS # BLD AUTO: 0.05 X10*3/UL (ref 0–0.1)
BASOPHILS NFR BLD AUTO: 1.1 %
BILIRUB SERPL-MCNC: 1.5 MG/DL (ref 0–1.2)
BUN SERPL-MCNC: 13 MG/DL (ref 6–23)
BURR CELLS BLD QL SMEAR: NORMAL
CALCIUM SERPL-MCNC: 9.3 MG/DL (ref 8.6–10.6)
CHLORIDE SERPL-SCNC: 110 MMOL/L (ref 98–107)
CHOLEST SERPL-MCNC: 123 MG/DL (ref 0–199)
CHOLESTEROL/HDL RATIO: 3.8
CO2 SERPL-SCNC: 25 MMOL/L (ref 21–32)
CREAT SERPL-MCNC: 0.83 MG/DL (ref 0.5–1.05)
EGFRCR SERPLBLD CKD-EPI 2021: 89 ML/MIN/1.73M*2
EOSINOPHIL # BLD AUTO: 0.14 X10*3/UL (ref 0–0.7)
EOSINOPHIL NFR BLD AUTO: 3 %
ERYTHROCYTE [DISTWIDTH] IN BLOOD BY AUTOMATED COUNT: 13.1 % (ref 11.5–14.5)
EST. AVERAGE GLUCOSE BLD GHB EST-MCNC: 108 MG/DL
GLUCOSE SERPL-MCNC: 111 MG/DL (ref 74–99)
HBA1C MFR BLD: 5.4 %
HCT VFR BLD AUTO: 47.8 % (ref 36–46)
HDLC SERPL-MCNC: 32.6 MG/DL
HGB BLD-MCNC: 15.4 G/DL (ref 12–16)
IMM GRANULOCYTES # BLD AUTO: 0.1 X10*3/UL (ref 0–0.7)
IMM GRANULOCYTES NFR BLD AUTO: 2.2 % (ref 0–0.9)
LDLC SERPL CALC-MCNC: 58 MG/DL
LYMPHOCYTES # BLD AUTO: 1.52 X10*3/UL (ref 1.2–4.8)
LYMPHOCYTES NFR BLD AUTO: 33 %
MCH RBC QN AUTO: 31.4 PG (ref 26–34)
MCHC RBC AUTO-ENTMCNC: 32.2 G/DL (ref 32–36)
MCV RBC AUTO: 97 FL (ref 80–100)
MONOCYTES # BLD AUTO: 0.32 X10*3/UL (ref 0.1–1)
MONOCYTES NFR BLD AUTO: 7 %
NEUTROPHILS # BLD AUTO: 2.47 X10*3/UL (ref 1.2–7.7)
NEUTROPHILS NFR BLD AUTO: 53.7 %
NON HDL CHOLESTEROL: 90 MG/DL (ref 0–149)
NRBC BLD-RTO: 0 /100 WBCS (ref 0–0)
PLATELET # BLD AUTO: ABNORMAL 10*3/UL
POTASSIUM SERPL-SCNC: 3.9 MMOL/L (ref 3.5–5.3)
PROT SERPL-MCNC: 6.9 G/DL (ref 6.4–8.2)
RBC # BLD AUTO: 4.91 X10*6/UL (ref 4–5.2)
RBC MORPH BLD: NORMAL
SODIUM SERPL-SCNC: 142 MMOL/L (ref 136–145)
SPHEROCYTES BLD QL SMEAR: NORMAL
TRIGL SERPL-MCNC: 163 MG/DL (ref 0–149)
TSH SERPL-ACNC: 2 MIU/L (ref 0.44–3.98)
VLDL: 33 MG/DL (ref 0–40)
WBC # BLD AUTO: 4.6 X10*3/UL (ref 4.4–11.3)

## 2024-10-22 ENCOUNTER — OFFICE VISIT (OUTPATIENT)
Dept: PRIMARY CARE | Facility: CLINIC | Age: 44
End: 2024-10-22
Payer: COMMERCIAL

## 2024-10-22 VITALS
WEIGHT: 207.2 LBS | OXYGEN SATURATION: 97 % | SYSTOLIC BLOOD PRESSURE: 124 MMHG | DIASTOLIC BLOOD PRESSURE: 82 MMHG | BODY MASS INDEX: 33.44 KG/M2 | HEART RATE: 98 BPM

## 2024-10-22 DIAGNOSIS — Z00.00 ROUTINE GENERAL MEDICAL EXAMINATION AT A HEALTH CARE FACILITY: Primary | ICD-10-CM

## 2024-10-22 DIAGNOSIS — I10 PRIMARY HYPERTENSION: ICD-10-CM

## 2024-10-22 DIAGNOSIS — G43.009 MIGRAINE WITHOUT AURA, NOT REFRACTORY: ICD-10-CM

## 2024-10-22 DIAGNOSIS — F41.1 GENERALIZED ANXIETY DISORDER: ICD-10-CM

## 2024-10-22 DIAGNOSIS — E66.9 OBESITY (BMI 30-39.9): ICD-10-CM

## 2024-10-22 DIAGNOSIS — G47.33 OSA (OBSTRUCTIVE SLEEP APNEA): ICD-10-CM

## 2024-10-22 DIAGNOSIS — J45.20 MILD INTERMITTENT ASTHMA WITHOUT COMPLICATION (HHS-HCC): ICD-10-CM

## 2024-10-22 PROCEDURE — RXMED WILLOW AMBULATORY MEDICATION CHARGE

## 2024-10-22 PROCEDURE — 1036F TOBACCO NON-USER: CPT | Performed by: FAMILY MEDICINE

## 2024-10-22 PROCEDURE — 3074F SYST BP LT 130 MM HG: CPT | Performed by: FAMILY MEDICINE

## 2024-10-22 PROCEDURE — 3079F DIAST BP 80-89 MM HG: CPT | Performed by: FAMILY MEDICINE

## 2024-10-22 PROCEDURE — 99396 PREV VISIT EST AGE 40-64: CPT | Performed by: FAMILY MEDICINE

## 2024-10-22 RX ORDER — MONTELUKAST SODIUM 10 MG/1
10 TABLET ORAL DAILY
Qty: 90 TABLET | Refills: 0 | Status: SHIPPED | OUTPATIENT
Start: 2024-10-22

## 2024-10-22 RX ORDER — SEMAGLUTIDE 0.25 MG/.5ML
0.5 INJECTION, SOLUTION SUBCUTANEOUS WEEKLY
Qty: 2 ML | Refills: 0 | Status: SHIPPED | OUTPATIENT
Start: 2024-10-22 | End: 2024-11-19

## 2024-10-22 ASSESSMENT — PATIENT HEALTH QUESTIONNAIRE - PHQ9
SUM OF ALL RESPONSES TO PHQ9 QUESTIONS 1 & 2: 0
2. FEELING DOWN, DEPRESSED OR HOPELESS: NOT AT ALL
1. LITTLE INTEREST OR PLEASURE IN DOING THINGS: NOT AT ALL

## 2024-10-22 ASSESSMENT — LIFESTYLE VARIABLES
HOW OFTEN DO YOU HAVE A DRINK CONTAINING ALCOHOL: NEVER
HOW MANY STANDARD DRINKS CONTAINING ALCOHOL DO YOU HAVE ON A TYPICAL DAY: PATIENT DOES NOT DRINK
AUDIT-C TOTAL SCORE: 0
HOW OFTEN DO YOU HAVE SIX OR MORE DRINKS ON ONE OCCASION: NEVER
SKIP TO QUESTIONS 9-10: 1

## 2024-10-22 ASSESSMENT — ENCOUNTER SYMPTOMS
LOSS OF SENSATION IN FEET: 0
DEPRESSION: 0
OCCASIONAL FEELINGS OF UNSTEADINESS: 0

## 2024-10-22 ASSESSMENT — PAIN SCALES - GENERAL: PAINLEVEL_OUTOF10: 0-NO PAIN

## 2024-10-22 NOTE — PATIENT INSTRUCTIONS
Here for physical.  Blood work is very good. Up to date on mammogram and colonoscopy.      For migraines - seeing specialist - continue current regimen.  Discuss aimovig or ajovy- these are preventative medications.      For blood pressure - well controlled.  Plan is weight loss - if you start getting dizzy we will decrease and/or stop hydrochlorothiazide or amlodipine.     For asthma - use singulair until the first frost and then restart in spring.      For sleep apnea - continue oral appliance.  Plan is weight loss.     For weight loss - increase wegovy to 0.5mg - in 4 weeks plan is to increase to 1mg.  Max dose is 2.4 - we can continue to increase if tolerating.      Follow up in 6 months for recheck

## 2024-10-22 NOTE — PROGRESS NOTES
Subjective   Patient ID: Zoe Wallace is a 44 y.o. female who presents for Annual Exam.    Patient is here for annual physical.      Hypertension  -Patient is here for follow-up of elevated blood pressure.  No new changes.   -Blood pressure is well controlled.  -Cardiac symptoms: none  -Patient denies chest pain, SOB  -Cardiologist: Dr. Blancas    Anxiety  -F/U: Doing well.  Sleeping well.  Cymbalta - helping mood.    -Symptoms have been stable    -She denies current suicidal and homicidal ideation.    -Current Treatment: sertraline  -Counseling: none  -Previous treatment includes:     Headache:  -Type of headache: migraine without aura  -Symptoms:  unilateral, pounding headache. R    -Frequency: 4-5 times a month  -Associated symptoms:  -Treatment: nurtec, topamax and cymbalta. Tylenol, Sumatriptan  -Specialist:  none  -Past Medications:        Propranolol - sedation    Obstructed Sleep Apnea  -F/U: Pt has NIKOLAI - pt has oral appliance.   -Compliance: wearing nightly  -Response:  fair  -Specialist:     Asthma:  -F/U: doing well.    -Medications Using: albuterol, singulair  -Past Medications:  -Frequency of Symptoms: < 2 times a week  -Rescue Inhaler Use: < 2 times a week  -Nocturnal Symptoms: none  -Prednisone Use: none  -Specialist:             Review of Systems    Objective   /82 (BP Location: Right arm, Patient Position: Sitting)   Pulse 98   Wt 94 kg (207 lb 3.2 oz)   SpO2 97%   BMI 33.44 kg/m²     Physical Exam  Vitals reviewed.   Constitutional:       General: She is not in acute distress.  Cardiovascular:      Rate and Rhythm: Normal rate and regular rhythm.   Pulmonary:      Effort: Pulmonary effort is normal.      Breath sounds: No wheezing or rhonchi.   Musculoskeletal:      Right lower leg: No edema.      Left lower leg: No edema.   Lymphadenopathy:      Cervical: No cervical adenopathy.   Neurological:      Mental Status: She is alert.         Assessment/Plan   Diagnoses and all orders for this  visit:  Routine general medical examination at a health care facility  Obesity (BMI 30-39.9)  Primary hypertension  Migraine without aura, not refractory  Generalized anxiety disorder  Mild intermittent asthma without complication ([facility]CC)  NIKOLAI (obstructive sleep apnea)         Patient Instructions   Here for physical.  Blood work is very good. Up to date on mammogram and colonoscopy.      For migraines - seeing specialist - continue current regimen.  Discuss aimovig or ajovy- these are preventative medications.      For blood pressure - well controlled.  Plan is weight loss - if you start getting dizzy we will decrease and/or stop hydrochlorothiazide or amlodipine.     For asthma - use singulair until the first frost and then restart in spring.      For sleep apnea - continue oral appliance.  Plan is weight loss.     For weight loss - increase wegovy to 0.5mg - in 4 weeks plan is to increase to 1mg.  Max dose is 2.4 - we can continue to increase if tolerating.      Follow up in 6 months for recheck

## 2024-10-29 ENCOUNTER — PATIENT MESSAGE (OUTPATIENT)
Dept: PRIMARY CARE | Facility: CLINIC | Age: 44
End: 2024-10-29
Payer: COMMERCIAL

## 2024-10-29 DIAGNOSIS — G43.009 MIGRAINE WITHOUT AURA, NOT REFRACTORY: ICD-10-CM

## 2024-10-29 RX ORDER — RIMEGEPANT SULFATE 75 MG/75MG
75 TABLET, ORALLY DISINTEGRATING ORAL EVERY OTHER DAY
Qty: 16 TABLET | Refills: 2 | Status: SHIPPED | OUTPATIENT
Start: 2024-10-29

## 2024-10-30 ENCOUNTER — PHARMACY VISIT (OUTPATIENT)
Dept: PHARMACY | Facility: CLINIC | Age: 44
End: 2024-10-30
Payer: COMMERCIAL

## 2024-11-06 ENCOUNTER — APPOINTMENT (OUTPATIENT)
Dept: URGENT CARE | Age: 44
End: 2024-11-06
Payer: COMMERCIAL

## 2024-11-10 DIAGNOSIS — K21.9 GASTROESOPHAGEAL REFLUX DISEASE WITHOUT ESOPHAGITIS: ICD-10-CM

## 2024-11-10 RX ORDER — FAMOTIDINE 40 MG/1
40 TABLET, FILM COATED ORAL 2 TIMES DAILY
Qty: 180 TABLET | Refills: 1 | Status: SHIPPED | OUTPATIENT
Start: 2024-11-10

## 2024-11-20 ENCOUNTER — PATIENT MESSAGE (OUTPATIENT)
Dept: PRIMARY CARE | Facility: CLINIC | Age: 44
End: 2024-11-20
Payer: COMMERCIAL

## 2024-11-20 DIAGNOSIS — E66.9 OBESITY (BMI 30-39.9): ICD-10-CM

## 2024-11-20 DIAGNOSIS — E66.9 OBESITY (BMI 30-39.9): Primary | ICD-10-CM

## 2024-11-20 RX ORDER — SEMAGLUTIDE 0.25 MG/.5ML
0.5 INJECTION, SOLUTION SUBCUTANEOUS WEEKLY
Qty: 2 ML | Refills: 0 | OUTPATIENT
Start: 2024-11-20 | End: 2024-12-12

## 2024-11-20 RX ORDER — SEMAGLUTIDE 1 MG/.5ML
1 INJECTION, SOLUTION SUBCUTANEOUS WEEKLY
Qty: 2 ML | Refills: 2 | Status: SHIPPED | OUTPATIENT
Start: 2024-11-20 | End: 2024-11-21 | Stop reason: SDUPTHER

## 2024-11-21 PROCEDURE — RXMED WILLOW AMBULATORY MEDICATION CHARGE

## 2024-11-21 RX ORDER — SEMAGLUTIDE 1 MG/.5ML
1 INJECTION, SOLUTION SUBCUTANEOUS WEEKLY
Qty: 2 ML | Refills: 2 | Status: SHIPPED | OUTPATIENT
Start: 2024-11-21 | End: 2024-12-20

## 2024-11-25 ENCOUNTER — PHARMACY VISIT (OUTPATIENT)
Dept: PHARMACY | Facility: CLINIC | Age: 44
End: 2024-11-25
Payer: COMMERCIAL

## 2024-11-25 ENCOUNTER — APPOINTMENT (OUTPATIENT)
Dept: OBSTETRICS AND GYNECOLOGY | Facility: CLINIC | Age: 44
End: 2024-11-25
Payer: COMMERCIAL

## 2024-11-25 VITALS
WEIGHT: 202 LBS | HEIGHT: 66 IN | BODY MASS INDEX: 32.47 KG/M2 | DIASTOLIC BLOOD PRESSURE: 86 MMHG | SYSTOLIC BLOOD PRESSURE: 150 MMHG

## 2024-11-25 DIAGNOSIS — G43.009 MIGRAINE WITHOUT AURA, NOT REFRACTORY: ICD-10-CM

## 2024-11-25 DIAGNOSIS — Z30.42 ENCOUNTER FOR DEPO-PROVERA CONTRACEPTION: ICD-10-CM

## 2024-11-25 PROCEDURE — 96372 THER/PROPH/DIAG INJ SC/IM: CPT | Performed by: OBSTETRICS & GYNECOLOGY

## 2024-11-25 RX ORDER — ONDANSETRON 4 MG/1
4 TABLET, FILM COATED ORAL EVERY 8 HOURS PRN
Qty: 30 TABLET | Refills: 5 | Status: SHIPPED | OUTPATIENT
Start: 2024-11-25 | End: 2025-05-24

## 2024-11-25 RX ORDER — MEDROXYPROGESTERONE ACETATE 150 MG/ML
150 INJECTION, SUSPENSION INTRAMUSCULAR ONCE
Status: COMPLETED | OUTPATIENT
Start: 2024-11-25 | End: 2024-11-25

## 2024-11-25 NOTE — PROGRESS NOTES
ISABEL IS HERE TODAY FOR HER REGULAR DEPO INJECTION    HER NEXT WINDOW FOR INJECTION WILL BE BETWEEN 02/10/2025 AND 02/24/2025    INJECTION WAS GIVEN INTRAMUSCULARLY IN HER RIGHT GLUTEAL AREA - PATIENT TOLERATED WELL    NDC - 45524-695-41  LOT - [id number]  EXP - 04/30/2028

## 2024-12-09 ENCOUNTER — PATIENT MESSAGE (OUTPATIENT)
Dept: PRIMARY CARE | Facility: CLINIC | Age: 44
End: 2024-12-09
Payer: COMMERCIAL

## 2024-12-22 ENCOUNTER — APPOINTMENT (OUTPATIENT)
Dept: RADIOLOGY | Facility: HOSPITAL | Age: 44
End: 2024-12-22
Payer: COMMERCIAL

## 2024-12-22 ENCOUNTER — HOSPITAL ENCOUNTER (EMERGENCY)
Facility: HOSPITAL | Age: 44
Discharge: HOME | End: 2024-12-22
Attending: STUDENT IN AN ORGANIZED HEALTH CARE EDUCATION/TRAINING PROGRAM
Payer: COMMERCIAL

## 2024-12-22 VITALS
TEMPERATURE: 98.1 F | SYSTOLIC BLOOD PRESSURE: 152 MMHG | HEART RATE: 84 BPM | DIASTOLIC BLOOD PRESSURE: 83 MMHG | WEIGHT: 195 LBS | HEIGHT: 66 IN | BODY MASS INDEX: 31.34 KG/M2 | OXYGEN SATURATION: 100 % | RESPIRATION RATE: 18 BRPM

## 2024-12-22 VITALS
HEIGHT: 66 IN | WEIGHT: 195 LBS | HEART RATE: 86 BPM | OXYGEN SATURATION: 97 % | DIASTOLIC BLOOD PRESSURE: 99 MMHG | RESPIRATION RATE: 18 BRPM | SYSTOLIC BLOOD PRESSURE: 144 MMHG | TEMPERATURE: 98.4 F | BODY MASS INDEX: 31.34 KG/M2

## 2024-12-22 DIAGNOSIS — N20.0 LEFT NEPHROLITHIASIS: Primary | ICD-10-CM

## 2024-12-22 DIAGNOSIS — N20.0 RENAL STONE: Primary | ICD-10-CM

## 2024-12-22 LAB
ALBUMIN SERPL BCP-MCNC: 4.1 G/DL (ref 3.4–5)
ALBUMIN SERPL BCP-MCNC: 4.4 G/DL (ref 3.4–5)
ALP SERPL-CCNC: 60 U/L (ref 33–110)
ALP SERPL-CCNC: 66 U/L (ref 33–110)
ALT SERPL W P-5'-P-CCNC: 19 U/L (ref 7–45)
ALT SERPL W P-5'-P-CCNC: 20 U/L (ref 7–45)
ANION GAP SERPL CALC-SCNC: 11 MMOL/L (ref 10–20)
ANION GAP SERPL CALC-SCNC: 17 MMOL/L (ref 10–20)
APPEARANCE UR: ABNORMAL
APPEARANCE UR: CLEAR
AST SERPL W P-5'-P-CCNC: 16 U/L (ref 9–39)
AST SERPL W P-5'-P-CCNC: 18 U/L (ref 9–39)
BASOPHILS # BLD AUTO: 0.07 X10*3/UL (ref 0–0.1)
BASOPHILS # BLD AUTO: 0.1 X10*3/UL (ref 0–0.1)
BASOPHILS NFR BLD AUTO: 0.8 %
BASOPHILS NFR BLD AUTO: 0.9 %
BILIRUB SERPL-MCNC: 0.9 MG/DL (ref 0–1.2)
BILIRUB SERPL-MCNC: 1 MG/DL (ref 0–1.2)
BILIRUB UR STRIP.AUTO-MCNC: NEGATIVE MG/DL
BILIRUB UR STRIP.AUTO-MCNC: NEGATIVE MG/DL
BUN SERPL-MCNC: 11 MG/DL (ref 6–23)
BUN SERPL-MCNC: 16 MG/DL (ref 6–23)
CALCIUM SERPL-MCNC: 8.8 MG/DL (ref 8.6–10.3)
CALCIUM SERPL-MCNC: 9.1 MG/DL (ref 8.6–10.3)
CHLORIDE SERPL-SCNC: 108 MMOL/L (ref 98–107)
CHLORIDE SERPL-SCNC: 110 MMOL/L (ref 98–107)
CO2 SERPL-SCNC: 21 MMOL/L (ref 21–32)
CO2 SERPL-SCNC: 23 MMOL/L (ref 21–32)
COLOR UR: ABNORMAL
COLOR UR: ABNORMAL
CREAT SERPL-MCNC: 0.74 MG/DL (ref 0.5–1.05)
CREAT SERPL-MCNC: 1 MG/DL (ref 0.5–1.05)
EGFRCR SERPLBLD CKD-EPI 2021: 71 ML/MIN/1.73M*2
EGFRCR SERPLBLD CKD-EPI 2021: >90 ML/MIN/1.73M*2
EOSINOPHIL # BLD AUTO: 0.18 X10*3/UL (ref 0–0.7)
EOSINOPHIL # BLD AUTO: 0.23 X10*3/UL (ref 0–0.7)
EOSINOPHIL NFR BLD AUTO: 2 %
EOSINOPHIL NFR BLD AUTO: 2.1 %
ERYTHROCYTE [DISTWIDTH] IN BLOOD BY AUTOMATED COUNT: 12.9 % (ref 11.5–14.5)
ERYTHROCYTE [DISTWIDTH] IN BLOOD BY AUTOMATED COUNT: 13 % (ref 11.5–14.5)
GLUCOSE SERPL-MCNC: 99 MG/DL (ref 74–99)
GLUCOSE SERPL-MCNC: 99 MG/DL (ref 74–99)
GLUCOSE UR STRIP.AUTO-MCNC: NORMAL MG/DL
GLUCOSE UR STRIP.AUTO-MCNC: NORMAL MG/DL
HCG UR QL IA.RAPID: NEGATIVE
HCT VFR BLD AUTO: 45 % (ref 36–46)
HCT VFR BLD AUTO: 48.6 % (ref 36–46)
HGB BLD-MCNC: 15 G/DL (ref 12–16)
HGB BLD-MCNC: 16.3 G/DL (ref 12–16)
IMM GRANULOCYTES # BLD AUTO: 0.03 X10*3/UL (ref 0–0.7)
IMM GRANULOCYTES # BLD AUTO: 0.04 X10*3/UL (ref 0–0.7)
IMM GRANULOCYTES NFR BLD AUTO: 0.4 % (ref 0–0.9)
IMM GRANULOCYTES NFR BLD AUTO: 0.4 % (ref 0–0.9)
KETONES UR STRIP.AUTO-MCNC: NEGATIVE MG/DL
KETONES UR STRIP.AUTO-MCNC: NEGATIVE MG/DL
LACTATE SERPL-SCNC: 0.7 MMOL/L (ref 0.4–2)
LEUKOCYTE ESTERASE UR QL STRIP.AUTO: NEGATIVE
LEUKOCYTE ESTERASE UR QL STRIP.AUTO: NEGATIVE
LYMPHOCYTES # BLD AUTO: 1.79 X10*3/UL (ref 1.2–4.8)
LYMPHOCYTES # BLD AUTO: 2.25 X10*3/UL (ref 1.2–4.8)
LYMPHOCYTES NFR BLD AUTO: 20 %
LYMPHOCYTES NFR BLD AUTO: 21.1 %
MAGNESIUM SERPL-MCNC: 2.06 MG/DL (ref 1.6–2.4)
MCH RBC QN AUTO: 30.9 PG (ref 26–34)
MCH RBC QN AUTO: 31.6 PG (ref 26–34)
MCHC RBC AUTO-ENTMCNC: 33.3 G/DL (ref 32–36)
MCHC RBC AUTO-ENTMCNC: 33.5 G/DL (ref 32–36)
MCV RBC AUTO: 93 FL (ref 80–100)
MCV RBC AUTO: 94 FL (ref 80–100)
MONOCYTES # BLD AUTO: 0.56 X10*3/UL (ref 0.1–1)
MONOCYTES # BLD AUTO: 0.8 X10*3/UL (ref 0.1–1)
MONOCYTES NFR BLD AUTO: 6.6 %
MONOCYTES NFR BLD AUTO: 7.1 %
MUCOUS THREADS #/AREA URNS AUTO: ABNORMAL /LPF
MUCOUS THREADS #/AREA URNS AUTO: ABNORMAL /LPF
NEUTROPHILS # BLD AUTO: 5.86 X10*3/UL (ref 1.2–7.7)
NEUTROPHILS # BLD AUTO: 7.81 X10*3/UL (ref 1.2–7.7)
NEUTROPHILS NFR BLD AUTO: 69 %
NEUTROPHILS NFR BLD AUTO: 69.6 %
NITRITE UR QL STRIP.AUTO: NEGATIVE
NITRITE UR QL STRIP.AUTO: NEGATIVE
NRBC BLD-RTO: 0 /100 WBCS (ref 0–0)
NRBC BLD-RTO: 0 /100 WBCS (ref 0–0)
PH UR STRIP.AUTO: 5.5 [PH]
PH UR STRIP.AUTO: 6 [PH]
PLATELET # BLD AUTO: 283 X10*3/UL (ref 150–450)
PLATELET # BLD AUTO: 289 X10*3/UL (ref 150–450)
POTASSIUM SERPL-SCNC: 3.8 MMOL/L (ref 3.5–5.3)
POTASSIUM SERPL-SCNC: 4 MMOL/L (ref 3.5–5.3)
PROT SERPL-MCNC: 6.9 G/DL (ref 6.4–8.2)
PROT SERPL-MCNC: 7.1 G/DL (ref 6.4–8.2)
PROT UR STRIP.AUTO-MCNC: ABNORMAL MG/DL
PROT UR STRIP.AUTO-MCNC: ABNORMAL MG/DL
RBC # BLD AUTO: 4.85 X10*6/UL (ref 4–5.2)
RBC # BLD AUTO: 5.16 X10*6/UL (ref 4–5.2)
RBC # UR STRIP.AUTO: ABNORMAL /UL
RBC # UR STRIP.AUTO: ABNORMAL /UL
RBC #/AREA URNS AUTO: >20 /HPF
RBC #/AREA URNS AUTO: >20 /HPF
SODIUM SERPL-SCNC: 140 MMOL/L (ref 136–145)
SODIUM SERPL-SCNC: 142 MMOL/L (ref 136–145)
SP GR UR STRIP.AUTO: 1.02
SP GR UR STRIP.AUTO: 1.03
SQUAMOUS #/AREA URNS AUTO: ABNORMAL /HPF
UROBILINOGEN UR STRIP.AUTO-MCNC: NORMAL MG/DL
UROBILINOGEN UR STRIP.AUTO-MCNC: NORMAL MG/DL
WBC # BLD AUTO: 11.2 X10*3/UL (ref 4.4–11.3)
WBC # BLD AUTO: 8.5 X10*3/UL (ref 4.4–11.3)
WBC #/AREA URNS AUTO: ABNORMAL /HPF
WBC #/AREA URNS AUTO: ABNORMAL /HPF

## 2024-12-22 PROCEDURE — 96374 THER/PROPH/DIAG INJ IV PUSH: CPT | Mod: 59

## 2024-12-22 PROCEDURE — 74177 CT ABD & PELVIS W/CONTRAST: CPT | Mod: FOREIGN READ | Performed by: RADIOLOGY

## 2024-12-22 PROCEDURE — 83735 ASSAY OF MAGNESIUM: CPT

## 2024-12-22 PROCEDURE — 81001 URINALYSIS AUTO W/SCOPE: CPT | Performed by: PHYSICIAN ASSISTANT

## 2024-12-22 PROCEDURE — 81025 URINE PREGNANCY TEST: CPT

## 2024-12-22 PROCEDURE — 36415 COLL VENOUS BLD VENIPUNCTURE: CPT

## 2024-12-22 PROCEDURE — 96375 TX/PRO/DX INJ NEW DRUG ADDON: CPT

## 2024-12-22 PROCEDURE — 2500000004 HC RX 250 GENERAL PHARMACY W/ HCPCS (ALT 636 FOR OP/ED): Performed by: PHYSICIAN ASSISTANT

## 2024-12-22 PROCEDURE — 2500000004 HC RX 250 GENERAL PHARMACY W/ HCPCS (ALT 636 FOR OP/ED)

## 2024-12-22 PROCEDURE — 74177 CT ABD & PELVIS W/CONTRAST: CPT

## 2024-12-22 PROCEDURE — 81001 URINALYSIS AUTO W/SCOPE: CPT | Performed by: STUDENT IN AN ORGANIZED HEALTH CARE EDUCATION/TRAINING PROGRAM

## 2024-12-22 PROCEDURE — 2550000001 HC RX 255 CONTRASTS: Performed by: PHYSICIAN ASSISTANT

## 2024-12-22 PROCEDURE — 83605 ASSAY OF LACTIC ACID: CPT | Performed by: PHYSICIAN ASSISTANT

## 2024-12-22 PROCEDURE — 80053 COMPREHEN METABOLIC PANEL: CPT | Performed by: PHYSICIAN ASSISTANT

## 2024-12-22 PROCEDURE — 74176 CT ABD & PELVIS W/O CONTRAST: CPT

## 2024-12-22 PROCEDURE — 99285 EMERGENCY DEPT VISIT HI MDM: CPT | Mod: 25 | Performed by: STUDENT IN AN ORGANIZED HEALTH CARE EDUCATION/TRAINING PROGRAM

## 2024-12-22 PROCEDURE — 99284 EMERGENCY DEPT VISIT MOD MDM: CPT | Mod: 25 | Performed by: STUDENT IN AN ORGANIZED HEALTH CARE EDUCATION/TRAINING PROGRAM

## 2024-12-22 PROCEDURE — 85025 COMPLETE CBC W/AUTO DIFF WBC: CPT | Performed by: PHYSICIAN ASSISTANT

## 2024-12-22 PROCEDURE — 74176 CT ABD & PELVIS W/O CONTRAST: CPT | Performed by: STUDENT IN AN ORGANIZED HEALTH CARE EDUCATION/TRAINING PROGRAM

## 2024-12-22 PROCEDURE — 80053 COMPREHEN METABOLIC PANEL: CPT

## 2024-12-22 PROCEDURE — 36415 COLL VENOUS BLD VENIPUNCTURE: CPT | Performed by: PHYSICIAN ASSISTANT

## 2024-12-22 PROCEDURE — 85025 COMPLETE CBC W/AUTO DIFF WBC: CPT

## 2024-12-22 RX ORDER — DEXAMETHASONE SODIUM PHOSPHATE 10 MG/ML
10 INJECTION INTRAMUSCULAR; INTRAVENOUS ONCE
Status: DISCONTINUED | OUTPATIENT
Start: 2024-12-22 | End: 2024-12-22

## 2024-12-22 RX ORDER — MORPHINE SULFATE 2 MG/ML
1 INJECTION, SOLUTION INTRAMUSCULAR; INTRAVENOUS ONCE
Status: COMPLETED | OUTPATIENT
Start: 2024-12-22 | End: 2024-12-22

## 2024-12-22 RX ORDER — ONDANSETRON HYDROCHLORIDE 2 MG/ML
INJECTION, SOLUTION INTRAVENOUS
Status: COMPLETED
Start: 2024-12-22 | End: 2024-12-22

## 2024-12-22 RX ORDER — KETOROLAC TROMETHAMINE 10 MG/1
10 TABLET, FILM COATED ORAL EVERY 6 HOURS PRN
Qty: 20 TABLET | Refills: 0 | Status: SHIPPED | OUTPATIENT
Start: 2024-12-22 | End: 2024-12-27

## 2024-12-22 RX ORDER — ONDANSETRON HYDROCHLORIDE 2 MG/ML
4 INJECTION, SOLUTION INTRAVENOUS ONCE
Status: COMPLETED | OUTPATIENT
Start: 2024-12-22 | End: 2024-12-22

## 2024-12-22 RX ORDER — DEXAMETHASONE SODIUM PHOSPHATE 10 MG/ML
10 INJECTION INTRAMUSCULAR; INTRAVENOUS ONCE
Status: COMPLETED | OUTPATIENT
Start: 2024-12-22 | End: 2024-12-22

## 2024-12-22 RX ORDER — LIDOCAINE 560 MG/1
1 PATCH PERCUTANEOUS; TOPICAL; TRANSDERMAL DAILY
Status: DISCONTINUED | OUTPATIENT
Start: 2024-12-22 | End: 2024-12-22 | Stop reason: HOSPADM

## 2024-12-22 RX ORDER — KETOROLAC TROMETHAMINE 15 MG/ML
15 INJECTION, SOLUTION INTRAMUSCULAR; INTRAVENOUS ONCE
Status: COMPLETED | OUTPATIENT
Start: 2024-12-22 | End: 2024-12-22

## 2024-12-22 RX ORDER — ONDANSETRON 4 MG/1
4 TABLET, ORALLY DISINTEGRATING ORAL ONCE
Status: DISCONTINUED | OUTPATIENT
Start: 2024-12-22 | End: 2024-12-22

## 2024-12-22 ASSESSMENT — PAIN DESCRIPTION - LOCATION
LOCATION: ABDOMEN
LOCATION: BACK

## 2024-12-22 ASSESSMENT — PAIN SCALES - GENERAL
PAINLEVEL_OUTOF10: 8
PAINLEVEL_OUTOF10: 0 - NO PAIN
PAINLEVEL_OUTOF10: 6
PAINLEVEL_OUTOF10: 8
PAINLEVEL_OUTOF10: 9

## 2024-12-22 ASSESSMENT — PAIN - FUNCTIONAL ASSESSMENT
PAIN_FUNCTIONAL_ASSESSMENT: 0-10
PAIN_FUNCTIONAL_ASSESSMENT: 0-10

## 2024-12-22 ASSESSMENT — LIFESTYLE VARIABLES
TOTAL SCORE: 0
EVER HAD A DRINK FIRST THING IN THE MORNING TO STEADY YOUR NERVES TO GET RID OF A HANGOVER: NO
EVER FELT BAD OR GUILTY ABOUT YOUR DRINKING: NO
TOTAL SCORE: 0
HAVE YOU EVER FELT YOU SHOULD CUT DOWN ON YOUR DRINKING: NO
HAVE PEOPLE ANNOYED YOU BY CRITICIZING YOUR DRINKING: NO
HAVE PEOPLE ANNOYED YOU BY CRITICIZING YOUR DRINKING: NO
EVER FELT BAD OR GUILTY ABOUT YOUR DRINKING: NO
HAVE YOU EVER FELT YOU SHOULD CUT DOWN ON YOUR DRINKING: NO
EVER HAD A DRINK FIRST THING IN THE MORNING TO STEADY YOUR NERVES TO GET RID OF A HANGOVER: NO

## 2024-12-22 ASSESSMENT — PAIN DESCRIPTION - PAIN TYPE: TYPE: ACUTE PAIN

## 2024-12-22 ASSESSMENT — COLUMBIA-SUICIDE SEVERITY RATING SCALE - C-SSRS
6. HAVE YOU EVER DONE ANYTHING, STARTED TO DO ANYTHING, OR PREPARED TO DO ANYTHING TO END YOUR LIFE?: NO
1. IN THE PAST MONTH, HAVE YOU WISHED YOU WERE DEAD OR WISHED YOU COULD GO TO SLEEP AND NOT WAKE UP?: NO
6. HAVE YOU EVER DONE ANYTHING, STARTED TO DO ANYTHING, OR PREPARED TO DO ANYTHING TO END YOUR LIFE?: NO
1. IN THE PAST MONTH, HAVE YOU WISHED YOU WERE DEAD OR WISHED YOU COULD GO TO SLEEP AND NOT WAKE UP?: NO
2. HAVE YOU ACTUALLY HAD ANY THOUGHTS OF KILLING YOURSELF?: NO
2. HAVE YOU ACTUALLY HAD ANY THOUGHTS OF KILLING YOURSELF?: NO

## 2024-12-22 ASSESSMENT — PAIN DESCRIPTION - ORIENTATION
ORIENTATION: LEFT
ORIENTATION: LEFT

## 2024-12-22 NOTE — DISCHARGE INSTRUCTIONS
Imaging indicated that you had a kidney stone that has already passed into your bladder.  Stay hydrated to facilitate complete passage of stone.

## 2024-12-22 NOTE — ED TRIAGE NOTES
PATIENT INSTRUCTIONS:      Given by:   Nurse    Instructed in:  If yes, include date/comment and person who did the instructions  Follow up with Dr. Kim in 2-3 weeks.     Medications:  Metronidazole (Flagyl) 500 mg every 12 hours. Last dose given at 12:51pm.     Pain medication:  May use Motrin every 6 hours for pain. Last dose given at 5:30 pm.    If any new symptoms or symptoms worsen please call your primary physician or go to the ER.            Patient/Family verbalized/demonstrated understanding of above Instructions:  yes  __________________________________________________________________________    OBJECTIVE CHECKLIST  Patient/Family has:    All medications brought from home   NA  Valuables from safe                            NA  Prescriptions                                       NA  All personal belongings                       NA  Equipment (oxygen, apnea monitor, wheelchair)     NA  Other:         __________________________________________________________________________  Discharge Survey Information  You may be receiving a survey from [facility].  Our goal is to provide the best patient care in the nation.  With your input, we can achieve this goal.    Which Discharge Education Sheets Provided:     Rehabilitation Follow-up:     Special Needs on Discharge (Specify)       Type of Discharge: Order  Mode of Discharge:  walking  Method of Transportation:Private Car  Destination:  home  Transfer:  Referral Form:   No  Agency/Organization:  Accompanied by:  Specify relationship under 18 years of age) mother    Discharge date:  10/12/2017    6:53 PM    Depression / Suicide Risk    As you are discharged from this [facility], it is important to learn how to keep safe from harming yourself.    Recognize the warning signs:  · Abrupt changes in personality, positive or negative- including increase in energy   · Giving away possessions  · Change in eating patterns- significant  Pt c/o Lt Flank pain with nausea.   weight changes-  positive or negative  · Change in sleeping patterns- unable to sleep or sleeping all the time   · Unwillingness or inability to communicate  · Depression  · Unusual sadness, discouragement and loneliness  · Talk of wanting to die  · Neglect of personal appearance   · Rebelliousness- reckless behavior  · Withdrawal from people/activities they love  · Confusion- inability to concentrate     If you or a loved one observes any of these behaviors or has concerns about self-harm, here's what you can do:  · Talk about it- your feelings and reasons for harming yourself  · Remove any means that you might use to hurt yourself (examples: pills, rope, extension cords, firearm)  · Get professional help from the community (Mental Health, Substance Abuse, psychological counseling)  · Do not be alone:Call your Safe Contact- someone whom you trust who will be there for you.  · Call your local CRISIS HOTLINE 216-8630 or 659-472-2936  · Call your local Children's Mobile Crisis Response Team [place] (288) 645-5088 or www.[employer]  · Call the toll free National Suicide Prevention Hotlines   · National Suicide Prevention Lifeline 805-437-HMOB (4820)  · National Hope Line Network 800-SUICIDE (488-9403)

## 2024-12-22 NOTE — ED TRIAGE NOTES
Patient here for flank pain Diagnosed with kidney stones earlier pain is back. Took zofran and motrin PTA

## 2024-12-22 NOTE — ED PROVIDER NOTES
HPI   Chief Complaint   Patient presents with    Flank Pain     Pt c/o Lt Flank pain with nausea.       Zoe Wallace is a 44 year old female with PMHx significant for chronic back pain who presents with c/o left flank pain and low back pain since 0530 this morning.  She states that it felt like someone punched her, not just feels uncomfortable, rated 8.5/10.  Nothing seems to make it worse or better.  She has associated nausea and mild left-sided abdominal pain.  Denies vomiting, dysuria, constipation, fevers, chills.      PMHx: GERD, hypothyroidism, migraine, obesity, osteopenia, chronic back pain, HTN, anxiety, NIKOLAI, asthma  Meds: semaglutide, pepcid, nurtec, sumatriptan, duloxetine, albuterol inhaler, hydrochlorothiazide, amlodipine   Soc: no tobacco, occasional alcohol use, no drug use                 Patient History   Past Medical History:   Diagnosis Date    Acute upper respiratory infection, unspecified 08/05/2019    Acute URI    Chronic ethmoidal sinusitis 07/15/2019    Ethmoid sinusitis    Chronic maxillary sinusitis 06/14/2019    Right maxillary sinusitis    Encounter for screening for malignant neoplasm of vagina     Vaginal Pap smear    Gastro-esophageal reflux disease without esophagitis 03/27/2019    Gastroesophageal reflux disease, esophagitis presence not specified    Lesion of ulnar nerve, unspecified upper limb 02/11/2014    Ulnar nerve impingement    Other conditions influencing health status     Menstruation    Otitis media, unspecified, right ear 03/14/2019    Right otitis media    Personal history of other (healed) physical injury and trauma 06/19/2014    History of sprain    Personal history of other diseases of the digestive system 07/16/2014    History of irritable bowel syndrome    Personal history of other diseases of the nervous system and sense organs 04/25/2014    History of carpal tunnel syndrome    Personal history of other diseases of the respiratory system 04/25/2014    Personal  history of acute sinusitis    Personal history of other diseases of the respiratory system 06/04/2013    History of extrinsic asthma    Personal history of other diseases of the respiratory system 09/29/2020    History of nasal polyp    Personal history of other diseases of the respiratory system 07/14/2015    History of acute pharyngitis    Personal history of other endocrine, nutritional and metabolic disease 07/16/2014    History of hypothyroidism    Personal history of other infectious and parasitic diseases 12/10/2018    History of candidiasis of mouth    Personal history of other specified conditions 12/14/2021    History of nasal congestion    Personal history of other specified conditions     History of snoring    Personal history of other specified conditions     History of heartburn    Unspecified injury of unspecified ankle, initial encounter 06/19/2014    Ankle injury    Unspecified otitis externa, right ear 03/14/2019    Right otitis externa    Unspecified otitis externa, unspecified ear 03/18/2019    Otitis externa     Past Surgical History:   Procedure Laterality Date    OTHER SURGICAL HISTORY  04/15/2014    Wrist Surgery    SINUS SURGERY  09/13/2013    Sinus Surgery    TOE SURGERY Right 12/2023     Family History   Problem Relation Name Age of Onset    Hypertension Mother      Hypertension Father      Asthma Father      Allergies Father       Social History     Tobacco Use    Smoking status: Former     Types: Cigarettes    Smokeless tobacco: Never   Vaping Use    Vaping status: Never Used   Substance Use Topics    Alcohol use: Not Currently    Drug use: Not Currently       Physical Exam   ED Triage Vitals   Temp Pulse Resp   36.9 C 97 18      SpO2 BP    97% /95      Physical Exam  Constitutional:       General: She is not in acute distress.     Comments: Appears comfortable   HENT:      Head: Normocephalic and atraumatic.      Mouth/Throat:      Mouth: Mucous membranes are moist.   Eyes:       Extraocular Movements: Extraocular movements intact.   Cardiovascular:      Rate and Rhythm: Normal rate and regular rhythm.      Heart sounds: No murmur heard.  Pulmonary:      Effort: No respiratory distress.      Breath sounds: No wheezing.   Abdominal:      General: Bowel sounds are normal. There is no distension.      Palpations: Abdomen is soft.      Tenderness: There is abdominal tenderness (LUQ, LLQ). There is left CVA tenderness. There is no right CVA tenderness.   Musculoskeletal:      Right lower leg: No edema.      Left lower leg: No edema.      Comments: Flexor strength 5/5 bilaterally, dorsiflexion/plantarflexion strength 5/5 bilaterally, no pain elicited with resisted hip flexion bilaterally, negative straight leg raise bilaterally, no TTP over spinous process in thoracic and lumbar region, mild left-sided lumbar paraspinal TTP and hypertonicity   Neurological:      General: No focal deficit present.      Mental Status: She is alert and oriented to person, place, and time.   Psychiatric:         Mood and Affect: Mood normal.           ED Course & MDM   ED Course as of 12/22/24 0956   Sun Dec 22, 2024   0708 Urinalysis with Reflex Microscopic(!)  3+ blood, >20 RBCs [CK]   0709 Toradol and Zofran given [CK]   0930 Pain improved from 8 to 7/10, nausea resolved. Ordered additional pain meds  [CK]   0947 CT abdomen pelvis wo IV contrast  Punctate calculus within the bladder at the left ureterovesicular junction which may represent a recently passed calculus. No hydronephrosis or nephrolithiasis otherwise. [CK]      ED Course User Index  [CK] Kaila Hughes,          Diagnoses as of 12/22/24 0956   Renal stone         Medical Decision Making  44-year-old female with history of chronic back pain presents with complaint of left-sided low back pain since 0530 this morning.  She was getting ready for work when she felt like something punched her in the side/back.  Musculoskeletal findings as described in  physical exam may indicate muscular origin of pain.  Associated nausea and abdominal findings as described in physical exam may indicate renal/urologic origin of pain.  UA with 3+ blood and >20 RBCs suspicious for presence of kidney stone given patient does not get menstrual periods since receiving the Depo shot.  Other labs WNL.  CT abdomen pelvis without contrast shows renal calculus within the urinary bladder at the left ureterovesicular junction.  Discussed results with patient, and she is agreeable to discharge home.  Discussed return precautions.         Kaila Hughes, DO  Resident  12/22/24 4225

## 2024-12-22 NOTE — ED PROVIDER NOTES
HPI   Chief Complaint   Patient presents with    Flank Pain     Diagnosed with kidney stones earlier pain is back. Took zofran and motrin PTA       History of present illness:  44-year-old female presents emergency room with complaints of left lower back pain that began 2 hours ago.  The patient states that she was here earlier this morning at the emergency room and evaluated and told that she had a kidney stone in her left lower ureter that was passed into her bladder.  She states that she was concerned she might be having a kidney stone as she has sudden onset of lower back pain again but states it is only in her left lower back and the lumbar region does not radiate to her hip or into her upper back or to her stomach.  She has no nausea or vomiting or fevers or chills or any other symptoms at this time.  She denies any pain into her legs and denies any difficulty ambulating.  She denies any other symptoms at this time.  Per the charting system the patient has a history of acid reflux recurring sinusitis as well as chronic back pain.    Social history: Negative for alcohol and drug use.    Review of systems:   Gen.: No weight loss, fatigue, anorexia, insomnia, fever.   Eyes: No vision loss, double vision, drainage, eye pain.   ENT: No pharyngitis, neck pain, headache  Cardiac: No chest pain, palpitations, syncope, near syncope.   Pulmonary: No shortness of breath, cough, hemoptysis.   Heme/lymph: No swollen glands, fever, bleeding.   GI: No abdominal pain, change in bowel habits, melena, hematemesis, hematochezia, nausea, vomiting, diarrhea.   : No discharge, dysuria, frequency, urgency, hematuria.   Musculoskeletal: No limb pain, joint pain, joint swelling.   Skin: No rashes.   Review of systems is otherwise negative unless stated above or in history of present illness.      Physical exam:  General: Vitals noted, no distress. Afebrile.  Patient does not appear in any distress at this time and is found sitting  in the bed comfortably and texting on her phone.  EENT: No lymphadenopathy appreciated  Cardiac: Regular, rate, rhythm, no murmur.   Pulmonary: Lungs clear bilaterally with good aeration. No adventitious breath sounds.   Abdomen: Soft, nonsurgical. Nontender. No peritoneal signs. Normoactive bowel sounds.   Back: No pain to palpation of the back no pain to palpation of the spine no step-off appreciated  Extremities: No peripheral edema.   Skin: No rash.   Neuro: No focal neurologic deficits        Medical decision making:   Testing: CBC CMP lipase lactate urinalysis: Laboratory testing just shows greater than 20 red blood cells but no other acute findings CT scan is pending at this time  Plan: 44-year-old female presents emergency room with complaints of left lower back pain that began 2 hours ago.  The patient states that she was here earlier this morning at the emergency room and evaluated and told that she had a kidney stone in her left lower ureter that was passed into her bladder.  She states that she was concerned she might be having a kidney stone as she has sudden onset of lower back pain again but states it is only in her left lower back and the lumbar region does not radiate to her hip or into her upper back or to her stomach.  She has no nausea or vomiting or fevers or chills or any other symptoms at this time.  She denies any pain into her legs and denies any difficulty ambulating.  She denies any other symptoms at this time.  Per the charting system the patient has a history of acid reflux recurring sinusitis as well as chronic back pain. Pulmonary: Lungs clear bilaterally with good aeration. No adventitious breath sounds.   Abdomen: Soft, nonsurgical. Nontender. No peritoneal signs. Normoactive bowel sounds.   Back: No pain to palpation of the back no pain to palpation of the spine no step-off appreciated.  The care of the patient was handed to oncoming team physician at this time pending the repeat CT  scan.  Impression:   1.  Back pain            History provided by:  Patient   used: No            Patient History   Past Medical History:   Diagnosis Date    Acute upper respiratory infection, unspecified 08/05/2019    Acute URI    Chronic ethmoidal sinusitis 07/15/2019    Ethmoid sinusitis    Chronic maxillary sinusitis 06/14/2019    Right maxillary sinusitis    Encounter for screening for malignant neoplasm of vagina     Vaginal Pap smear    Gastro-esophageal reflux disease without esophagitis 03/27/2019    Gastroesophageal reflux disease, esophagitis presence not specified    Lesion of ulnar nerve, unspecified upper limb 02/11/2014    Ulnar nerve impingement    Other conditions influencing health status     Menstruation    Otitis media, unspecified, right ear 03/14/2019    Right otitis media    Personal history of other (healed) physical injury and trauma 06/19/2014    History of sprain    Personal history of other diseases of the digestive system 07/16/2014    History of irritable bowel syndrome    Personal history of other diseases of the nervous system and sense organs 04/25/2014    History of carpal tunnel syndrome    Personal history of other diseases of the respiratory system 04/25/2014    Personal history of acute sinusitis    Personal history of other diseases of the respiratory system 06/04/2013    History of extrinsic asthma    Personal history of other diseases of the respiratory system 09/29/2020    History of nasal polyp    Personal history of other diseases of the respiratory system 07/14/2015    History of acute pharyngitis    Personal history of other endocrine, nutritional and metabolic disease 07/16/2014    History of hypothyroidism    Personal history of other infectious and parasitic diseases 12/10/2018    History of candidiasis of mouth    Personal history of other specified conditions 12/14/2021    History of nasal congestion    Personal history of other specified  conditions     History of snoring    Personal history of other specified conditions     History of heartburn    Unspecified injury of unspecified ankle, initial encounter 06/19/2014    Ankle injury    Unspecified otitis externa, right ear 03/14/2019    Right otitis externa    Unspecified otitis externa, unspecified ear 03/18/2019    Otitis externa     Past Surgical History:   Procedure Laterality Date    OTHER SURGICAL HISTORY  04/15/2014    Wrist Surgery    SINUS SURGERY  09/13/2013    Sinus Surgery    TOE SURGERY Right 12/2023     Family History   Problem Relation Name Age of Onset    Hypertension Mother      Hypertension Father      Asthma Father      Allergies Father       Social History     Tobacco Use    Smoking status: Former     Types: Cigarettes    Smokeless tobacco: Never   Vaping Use    Vaping status: Never Used   Substance Use Topics    Alcohol use: Not Currently    Drug use: Not Currently       Physical Exam   ED Triage Vitals [12/22/24 1603]   Temperature Heart Rate Respirations BP   36.7 °C (98.1 °F) 83 18 (!) 191/118      Pulse Ox Temp Source Heart Rate Source Patient Position   98 % Skin Monitor Lying      BP Location FiO2 (%)     Right arm --       Physical Exam      ED Course & MDM   Diagnoses as of 12/23/24 1436   Left nephrolithiasis                 No data recorded     Dayana Coma Scale Score: 15 (12/22/24 1604 : Noman Ledezma RN)                           Medical Decision Making      Procedure  Procedures     Jaden Hall PA-C  12/23/24 1431

## 2024-12-23 LAB — HOLD SPECIMEN: NORMAL

## 2024-12-25 ENCOUNTER — PATIENT MESSAGE (OUTPATIENT)
Dept: PRIMARY CARE | Facility: CLINIC | Age: 44
End: 2024-12-25
Payer: COMMERCIAL

## 2024-12-25 DIAGNOSIS — E66.9 OBESITY (BMI 30-39.9): ICD-10-CM

## 2024-12-26 PROCEDURE — RXMED WILLOW AMBULATORY MEDICATION CHARGE

## 2024-12-26 RX ORDER — SEMAGLUTIDE 1 MG/.5ML
1 INJECTION, SOLUTION SUBCUTANEOUS WEEKLY
Qty: 2 ML | Refills: 2 | Status: SHIPPED | OUTPATIENT
Start: 2024-12-26 | End: 2025-01-25

## 2024-12-27 ENCOUNTER — PHARMACY VISIT (OUTPATIENT)
Dept: PHARMACY | Facility: CLINIC | Age: 44
End: 2024-12-27
Payer: COMMERCIAL

## 2025-01-19 DIAGNOSIS — J45.20 MILD INTERMITTENT ASTHMA WITHOUT COMPLICATION (HHS-HCC): ICD-10-CM

## 2025-01-19 RX ORDER — MONTELUKAST SODIUM 10 MG/1
10 TABLET ORAL DAILY
Qty: 90 TABLET | Refills: 1 | Status: SHIPPED | OUTPATIENT
Start: 2025-01-19

## 2025-01-22 ENCOUNTER — PATIENT MESSAGE (OUTPATIENT)
Dept: PRIMARY CARE | Facility: CLINIC | Age: 45
End: 2025-01-22
Payer: COMMERCIAL

## 2025-01-22 DIAGNOSIS — E66.9 OBESITY (BMI 30-39.9): Primary | ICD-10-CM

## 2025-01-22 RX ORDER — SEMAGLUTIDE 1.7 MG/.75ML
1.7 INJECTION, SOLUTION SUBCUTANEOUS WEEKLY
Qty: 3 ML | Refills: 2 | Status: SHIPPED | OUTPATIENT
Start: 2025-01-22 | End: 2025-01-23 | Stop reason: SDUPTHER

## 2025-01-23 PROCEDURE — RXMED WILLOW AMBULATORY MEDICATION CHARGE

## 2025-01-23 RX ORDER — SEMAGLUTIDE 1.7 MG/.75ML
1.7 INJECTION, SOLUTION SUBCUTANEOUS WEEKLY
Qty: 3 ML | Refills: 2 | Status: SHIPPED | OUTPATIENT
Start: 2025-01-23 | End: 2025-02-21

## 2025-01-24 ENCOUNTER — APPOINTMENT (OUTPATIENT)
Dept: NEUROLOGY | Facility: HOSPITAL | Age: 45
End: 2025-01-24
Payer: COMMERCIAL

## 2025-01-28 ENCOUNTER — DOCUMENTATION (OUTPATIENT)
Dept: NEUROLOGY | Facility: HOSPITAL | Age: 45
End: 2025-01-28
Payer: COMMERCIAL

## 2025-01-28 DIAGNOSIS — G43.009 MIGRAINE WITHOUT AURA, NOT REFRACTORY: Primary | ICD-10-CM

## 2025-01-28 RX ORDER — METHYLPREDNISOLONE 4 MG/1
TABLET ORAL
Qty: 21 TABLET | Refills: 0 | Status: SHIPPED | OUTPATIENT
Start: 2025-01-28

## 2025-01-28 RX ORDER — RIMEGEPANT SULFATE 75 MG/75MG
75 TABLET, ORALLY DISINTEGRATING ORAL DAILY PRN
Qty: 8 TABLET | Refills: 11 | Status: SHIPPED | OUTPATIENT
Start: 2025-01-28 | End: 2026-01-28

## 2025-01-28 RX ORDER — RIZATRIPTAN BENZOATE 10 MG/1
10 TABLET ORAL ONCE AS NEEDED
Qty: 9 TABLET | Refills: 11 | Status: SHIPPED | OUTPATIENT
Start: 2025-01-28 | End: 2026-01-28

## 2025-01-28 NOTE — PROGRESS NOTES
Discussed migraine in detail.  She has been having a migraine every day for the past 3 weeks.  Will try medrol dose nael, but migraine control in general has been poor.  She has tried topiramate but has been unable to tolerate any dose higher than 25mg.  She was unable to tolerate propranolol due to fatigue.  She has been on Nurtec every other day but continues to have frequent migraine and has been using Nurtec and sumatriptan when she has a headache, but the sumatriptan makes her fatigued and she is unable to function at work.  Will change the Nurtec to prn (8 pills per month), switch sumatriptan to rizatriptan when she has too many headaches for the Nurtec, and order Ajovy.    Note: she also tried duloxetine in 9/25/24.  She did not tolerate that either.

## 2025-01-29 ENCOUNTER — SPECIALTY PHARMACY (OUTPATIENT)
Dept: PHARMACY | Facility: CLINIC | Age: 45
End: 2025-01-29

## 2025-01-29 ENCOUNTER — PHARMACY VISIT (OUTPATIENT)
Dept: PHARMACY | Facility: CLINIC | Age: 45
End: 2025-01-29
Payer: COMMERCIAL

## 2025-01-29 ENCOUNTER — APPOINTMENT (OUTPATIENT)
Dept: URGENT CARE | Age: 45
End: 2025-01-29
Payer: COMMERCIAL

## 2025-01-29 PROCEDURE — RXMED WILLOW AMBULATORY MEDICATION CHARGE

## 2025-01-30 ENCOUNTER — SPECIALTY PHARMACY (OUTPATIENT)
Dept: PHARMACY | Facility: CLINIC | Age: 45
End: 2025-01-30

## 2025-01-31 PROCEDURE — RXMED WILLOW AMBULATORY MEDICATION CHARGE

## 2025-02-03 ENCOUNTER — SPECIALTY PHARMACY (OUTPATIENT)
Dept: PHARMACY | Facility: CLINIC | Age: 45
End: 2025-02-03

## 2025-02-04 ENCOUNTER — TELEPHONE (OUTPATIENT)
Dept: OBSTETRICS AND GYNECOLOGY | Facility: CLINIC | Age: 45
End: 2025-02-04
Payer: COMMERCIAL

## 2025-02-04 ENCOUNTER — PHARMACY VISIT (OUTPATIENT)
Dept: PHARMACY | Facility: CLINIC | Age: 45
End: 2025-02-04
Payer: COMMERCIAL

## 2025-02-04 DIAGNOSIS — Z30.011 ENCOUNTER FOR INITIAL PRESCRIPTION OF CONTRACEPTIVE PILLS: Primary | ICD-10-CM

## 2025-02-04 NOTE — TELEPHONE ENCOUNTER
Patient called after asking for Depoprovera refill. She has been on it for over 10 years for her periods. She had trouble remembering the pill and has HTN. Non smoker. I again discussed the risks of Depo including negative effect on bones and possible association with a meningioma. I would like her to come off now. She is willing to try Slynd. She does not use it for contraception and is not sexually active.

## 2025-02-05 ENCOUNTER — SPECIALTY PHARMACY (OUTPATIENT)
Dept: PHARMACY | Facility: CLINIC | Age: 45
End: 2025-02-05

## 2025-02-05 NOTE — PROGRESS NOTES
[facility] Clinical Note  Initial Patient Education     Introduction  Zoe Wallace is a 44 y.o. female who is on the specialty pharmacy service for management of: Migraine Core.    Zoe Wallace is initiating the following therapy: Ajovy 225 mg/1.5 mL auto-injector, 1 Pen (225 mg) under the skin every 28 (twenty-eight) days.    Medication receipt date: 02/05/2025  Duration of therapy: Maintenance    The most recent encounter visit with the referring prescriber Dr. Sary Chacon MD on 01/28/2025 was reviewed.  Pharmacy will continue to collaborate in the care of this patient with the referring prescriber.    Clinical Background  An initial assessment was conducted prior to first fill of the medication to determine the appropriateness of therapy given the patient's diagnosis, medication list, comorbidities, allergies, medical history, patient's ability to self administer medication, and therapeutic goals based on possible outcomes of therapy. Refer to initial assessment task completed on 02/03/2025.    Labs/Procedures for clinical appropriateness that were reviewed include:   Neurology - Migraine - There are no routine laboratory monitoring parameters for this medication    Education/Discussion  Zoe was contacted on 2/5/2025 at 11:53 AM for a pharmacy visit with encounter number [phone] from:   [facility]  [address] [place] [id number]  Dept: [phone]  Dept Fax: [phone]  Zoe consented to a/an Telephone visit, which was performed.    Medication Start Date (planned or actual): 02/05/2025  Education was conducted prior to start of therapy? Yes    Education discussed includes the following:  Patient Education  Counseled the Patient on the Following : Theraputic rationale and expected outcomes, Doses and administration, Possible side effects and management, Possible drug interactions, Safe handling, storage, and  "disposal, Pharmacy contact information  Learner: Patient  Education Method: Explanation  Education Response: Verbalizes understanding  Additional details of the medication specific counseling are found within the linked patient education flowsheet.     The follow up timeline was discussed. Every person responds to and reacts to therapy differently. Patient should be assessed for efficacy and tolerability in approximately: 3 months    Provided education on goals and possible outcomes of therapy:  Adherence with therapy  Timely completion of appropriate labs  Timely and appropriate follow up with provider  Identify and address medication interactions with presciption medications, OTC medications and supplements  Optimize or maintain quality of life  Neurology- Migraine: 50% reduction in migraine days each month from baseline  Decreased use of \"prn\" agents to treat migraine headaches  Improvement in MIDAS score from baseline    The importance of adherence was discussed and they were advised to take the medication as prescribed by their provider.     Impression/Plan  Review and Assessment   Medications Assessed for Appropriate Use, Dose, Route, Frequency, and Duration: Yes  Drug Interactions Evaluated: Yes  Clinically Relevant Drug Interactions Identified: No    This patient has not been identified as high risk due to Lack of high risk qualifiers.  The following action was taken: N/A.         The  Specialty Pharmacy Welcome packet may be viewed here:   Specialty Pharmacy Welcome Packet     Or by scanning QR code:      Provided contact information ([phone]) for [facility] and reviewed dispensing process, refill timeline and patient management follow up. Advised to contact the pharmacy if there are any adverse effects and/or changes to medication list, including prescriptions, OTC medications, herbal products, or supplements. Confirmed understanding of education conducted during " assessment. All questions and concerns were addressed and patient was encouraged to reach out for additional questions or concerns.      LUIS NARANJO

## 2025-02-15 ENCOUNTER — APPOINTMENT (OUTPATIENT)
Dept: RADIOLOGY | Facility: HOSPITAL | Age: 45
End: 2025-02-15
Payer: COMMERCIAL

## 2025-02-19 PROCEDURE — RXMED WILLOW AMBULATORY MEDICATION CHARGE

## 2025-02-20 ENCOUNTER — PHARMACY VISIT (OUTPATIENT)
Dept: PHARMACY | Facility: CLINIC | Age: 45
End: 2025-02-20
Payer: COMMERCIAL

## 2025-02-27 PROCEDURE — RXMED WILLOW AMBULATORY MEDICATION CHARGE

## 2025-02-28 ENCOUNTER — PHARMACY VISIT (OUTPATIENT)
Dept: PHARMACY | Facility: CLINIC | Age: 45
End: 2025-02-28
Payer: COMMERCIAL

## 2025-03-03 DIAGNOSIS — L29.2 VULVAR ITCHING: ICD-10-CM

## 2025-03-03 RX ORDER — CLOBETASOL PROPIONATE 0.5 MG/G
OINTMENT TOPICAL DAILY
Qty: 30 G | Refills: 0 | Status: SHIPPED | OUTPATIENT
Start: 2025-03-03

## 2025-03-06 ENCOUNTER — APPOINTMENT (OUTPATIENT)
Dept: NEUROLOGY | Facility: HOSPITAL | Age: 45
End: 2025-03-06
Payer: COMMERCIAL

## 2025-03-14 ENCOUNTER — TELEPHONE (OUTPATIENT)
Dept: OBSTETRICS AND GYNECOLOGY | Facility: CLINIC | Age: 45
End: 2025-03-14
Payer: COMMERCIAL

## 2025-03-14 DIAGNOSIS — L29.2 VULVAR ITCHING: ICD-10-CM

## 2025-03-14 RX ORDER — CLOBETASOL PROPIONATE 0.5 MG/G
OINTMENT TOPICAL DAILY
Qty: 30 G | Refills: 0 | Status: SHIPPED | OUTPATIENT
Start: 2025-03-14

## 2025-03-14 NOTE — TELEPHONE ENCOUNTER
DR MONREAL SENT IN A PRESCRIPTION FOR TEMOVATE CREAM FOR VULVAR ITCHING ON 3/3/25.    PATIENT CALLED STATING PHARMACY DID NOT RECEIVE PRESCRIPTION.     CALLED PATIENT BACK TO CONFIRM PHARMACY. LEFT VOICEMAIL.

## 2025-03-24 ENCOUNTER — PATIENT MESSAGE (OUTPATIENT)
Dept: PRIMARY CARE | Facility: CLINIC | Age: 45
End: 2025-03-24
Payer: COMMERCIAL

## 2025-03-24 DIAGNOSIS — R11.0 NAUSEA: Primary | ICD-10-CM

## 2025-03-24 RX ORDER — METOCLOPRAMIDE 10 MG/1
10 TABLET ORAL 4 TIMES DAILY
COMMUNITY
End: 2025-03-24 | Stop reason: SDUPTHER

## 2025-03-26 ENCOUNTER — PATIENT MESSAGE (OUTPATIENT)
Dept: PRIMARY CARE | Facility: CLINIC | Age: 45
End: 2025-03-26
Payer: COMMERCIAL

## 2025-03-26 DIAGNOSIS — E66.9 OBESITY (BMI 30-39.9): Primary | ICD-10-CM

## 2025-03-26 RX ORDER — SEMAGLUTIDE 2.4 MG/.75ML
2.4 INJECTION, SOLUTION SUBCUTANEOUS WEEKLY
Qty: 3 ML | Refills: 0 | Status: SHIPPED | OUTPATIENT
Start: 2025-03-26 | End: 2025-03-26 | Stop reason: SDUPTHER

## 2025-03-26 RX ORDER — METOCLOPRAMIDE 10 MG/1
10 TABLET ORAL 4 TIMES DAILY
Qty: 30 TABLET | Refills: 1 | Status: SHIPPED | OUTPATIENT
Start: 2025-03-26

## 2025-03-27 ENCOUNTER — SPECIALTY PHARMACY (OUTPATIENT)
Dept: PHARMACY | Facility: CLINIC | Age: 45
End: 2025-03-27

## 2025-03-27 PROCEDURE — RXMED WILLOW AMBULATORY MEDICATION CHARGE

## 2025-03-27 RX ORDER — SEMAGLUTIDE 2.4 MG/.75ML
2.4 INJECTION, SOLUTION SUBCUTANEOUS WEEKLY
Qty: 3 ML | Refills: 0 | Status: SHIPPED | OUTPATIENT
Start: 2025-03-27 | End: 2025-04-25

## 2025-03-31 ENCOUNTER — PHARMACY VISIT (OUTPATIENT)
Dept: PHARMACY | Facility: CLINIC | Age: 45
End: 2025-03-31
Payer: COMMERCIAL

## 2025-04-01 ENCOUNTER — PHARMACY VISIT (OUTPATIENT)
Dept: PHARMACY | Facility: CLINIC | Age: 45
End: 2025-04-01
Payer: COMMERCIAL

## 2025-04-16 ENCOUNTER — APPOINTMENT (OUTPATIENT)
Dept: CARDIOLOGY | Facility: HOSPITAL | Age: 45
End: 2025-04-16
Payer: COMMERCIAL

## 2025-04-16 ENCOUNTER — HOSPITAL ENCOUNTER (EMERGENCY)
Facility: HOSPITAL | Age: 45
Discharge: HOME | End: 2025-04-16
Attending: STUDENT IN AN ORGANIZED HEALTH CARE EDUCATION/TRAINING PROGRAM
Payer: COMMERCIAL

## 2025-04-16 ENCOUNTER — OFFICE VISIT (OUTPATIENT)
Dept: URGENT CARE | Age: 45
End: 2025-04-16
Payer: COMMERCIAL

## 2025-04-16 VITALS
DIASTOLIC BLOOD PRESSURE: 96 MMHG | SYSTOLIC BLOOD PRESSURE: 150 MMHG | HEART RATE: 86 BPM | TEMPERATURE: 98.2 F | WEIGHT: 188 LBS | BODY MASS INDEX: 30.34 KG/M2 | OXYGEN SATURATION: 97 % | RESPIRATION RATE: 20 BRPM

## 2025-04-16 VITALS
RESPIRATION RATE: 18 BRPM | BODY MASS INDEX: 30.22 KG/M2 | DIASTOLIC BLOOD PRESSURE: 103 MMHG | SYSTOLIC BLOOD PRESSURE: 146 MMHG | HEIGHT: 66 IN | OXYGEN SATURATION: 97 % | HEART RATE: 105 BPM | TEMPERATURE: 97.7 F | WEIGHT: 188 LBS

## 2025-04-16 DIAGNOSIS — R09.81 NASAL CONGESTION: ICD-10-CM

## 2025-04-16 DIAGNOSIS — J02.9 SORE THROAT: Primary | ICD-10-CM

## 2025-04-16 DIAGNOSIS — B97.89 PARAINFLUENZA VIRUS RHINOPHARYNGITIS: Primary | ICD-10-CM

## 2025-04-16 DIAGNOSIS — R11.2 NAUSEA AND VOMITING, UNSPECIFIED VOMITING TYPE: ICD-10-CM

## 2025-04-16 DIAGNOSIS — J00 PARAINFLUENZA VIRUS RHINOPHARYNGITIS: Primary | ICD-10-CM

## 2025-04-16 LAB
POC HUMAN RHINOVIRUS PCR: POSITIVE
POC INFLUENZA A VIRUS PCR: NEGATIVE
POC INFLUENZA B VIRUS PCR: NEGATIVE
POC RESPIRATORY SYNCYTIAL VIRUS PCR: NEGATIVE
POC STREPTOCOCCUS PYOGENES (GROUP A STREP) PCR: NEGATIVE

## 2025-04-16 PROCEDURE — 99283 EMERGENCY DEPT VISIT LOW MDM: CPT | Performed by: STUDENT IN AN ORGANIZED HEALTH CARE EDUCATION/TRAINING PROGRAM

## 2025-04-16 PROCEDURE — 2500000004 HC RX 250 GENERAL PHARMACY W/ HCPCS (ALT 636 FOR OP/ED)

## 2025-04-16 PROCEDURE — 93005 ELECTROCARDIOGRAM TRACING: CPT

## 2025-04-16 PROCEDURE — 2500000001 HC RX 250 WO HCPCS SELF ADMINISTERED DRUGS (ALT 637 FOR MEDICARE OP)

## 2025-04-16 RX ORDER — WATER
250 LIQUID (ML) MISCELLANEOUS ONCE
Status: COMPLETED | OUTPATIENT
Start: 2025-04-16 | End: 2025-04-16

## 2025-04-16 RX ORDER — METHYLPREDNISOLONE 4 MG/1
TABLET ORAL
Qty: 21 TABLET | Refills: 0 | Status: SHIPPED | OUTPATIENT
Start: 2025-04-16 | End: 2025-04-22

## 2025-04-16 RX ORDER — ONDANSETRON 4 MG/1
4 TABLET, ORALLY DISINTEGRATING ORAL EVERY 6 HOURS PRN
Qty: 12 TABLET | Refills: 0 | Status: SHIPPED | OUTPATIENT
Start: 2025-04-16

## 2025-04-16 RX ORDER — ONDANSETRON 4 MG/1
4 TABLET, ORALLY DISINTEGRATING ORAL ONCE
Status: COMPLETED | OUTPATIENT
Start: 2025-04-16 | End: 2025-04-16

## 2025-04-16 RX ORDER — ACETAMINOPHEN 325 MG/1
975 TABLET ORAL ONCE
Status: COMPLETED | OUTPATIENT
Start: 2025-04-16 | End: 2025-04-16

## 2025-04-16 RX ADMIN — ONDANSETRON 4 MG: 4 TABLET, ORALLY DISINTEGRATING ORAL at 18:38

## 2025-04-16 RX ADMIN — Medication 250 ML: at 18:59

## 2025-04-16 RX ADMIN — ACETAMINOPHEN 975 MG: 325 TABLET ORAL at 18:38

## 2025-04-16 ASSESSMENT — PAIN DESCRIPTION - DESCRIPTORS
DESCRIPTORS: ACHING;HEADACHE
DESCRIPTORS: ACHING;HEADACHE

## 2025-04-16 ASSESSMENT — COLUMBIA-SUICIDE SEVERITY RATING SCALE - C-SSRS
2. HAVE YOU ACTUALLY HAD ANY THOUGHTS OF KILLING YOURSELF?: NO
6. HAVE YOU EVER DONE ANYTHING, STARTED TO DO ANYTHING, OR PREPARED TO DO ANYTHING TO END YOUR LIFE?: NO
1. IN THE PAST MONTH, HAVE YOU WISHED YOU WERE DEAD OR WISHED YOU COULD GO TO SLEEP AND NOT WAKE UP?: NO

## 2025-04-16 ASSESSMENT — LIFESTYLE VARIABLES
TOTAL SCORE: 0
EVER FELT BAD OR GUILTY ABOUT YOUR DRINKING: NO
EVER HAD A DRINK FIRST THING IN THE MORNING TO STEADY YOUR NERVES TO GET RID OF A HANGOVER: NO
HAVE YOU EVER FELT YOU SHOULD CUT DOWN ON YOUR DRINKING: NO
HAVE PEOPLE ANNOYED YOU BY CRITICIZING YOUR DRINKING: NO

## 2025-04-16 ASSESSMENT — PAIN DESCRIPTION - FREQUENCY: FREQUENCY: CONSTANT/CONTINUOUS

## 2025-04-16 ASSESSMENT — PAIN DESCRIPTION - PROGRESSION: CLINICAL_PROGRESSION: GRADUALLY IMPROVING

## 2025-04-16 ASSESSMENT — PAIN SCALES - GENERAL
PAINLEVEL_OUTOF10: 2
PAINLEVEL_OUTOF10: 4

## 2025-04-16 ASSESSMENT — PAIN DESCRIPTION - LOCATION
LOCATION: HEAD
LOCATION: HEAD

## 2025-04-16 ASSESSMENT — PAIN - FUNCTIONAL ASSESSMENT
PAIN_FUNCTIONAL_ASSESSMENT: 0-10
PAIN_FUNCTIONAL_ASSESSMENT: 0-10

## 2025-04-16 ASSESSMENT — PAIN DESCRIPTION - PAIN TYPE
TYPE: ACUTE PAIN
TYPE: ACUTE PAIN

## 2025-04-16 ASSESSMENT — PAIN DESCRIPTION - ONSET: ONSET: SUDDEN

## 2025-04-16 NOTE — ED PROVIDER NOTES
[facility]  Department of Emergency Medicine   ED Encounter Note  Admit Date/RoomTime: 2025  6:05 PM  [facility]    NAME: Zoe Wallace  : 1980  MRN: [id number]     Chief Complaint:  Flu Symptoms      History of Present Illness     History provided by: Patient  Limitations to History: None  External Records Reviewed with Brief Summary:  ER visit from earlier today which showed patient with rhinovirus positive and was given Medrol Dosepak at discharge    Zoe Wallace is a 45 y.o. year old female patient with Past medical history significant for HTN, allergic rhinosinusitis, fatty infiltration of liver, GERD, HERNANDO, migraines without aura, NIKOLAI, primary insomnia, vitamin D deficiency, degenerative joint disease of right knee who came in on account of vomiting    The whole course started on Monday morning.  Patient stated that she has been sneezing a lot and was congested emesis.  On Tuesday she developed sore throat.  This morning she had an episode of vomiting and then vomited again 1-1/2-hour ago.  She is also endorsing generalized muscle aches and pains and headaches.  She has tried Tylenol for headache.  Of note, earlier in the day patient went to the urgent care for sore throat and congestion.  Viral panel done at that time was positive for rhinovirus.  Negative for flu A/B, RSV and group A streptococcus.  Patient was given Medrol Dosepak and discharged home.  She took Tylenol for headache and also took her Medrol Dosepak.  She stated she had a second episode of vomiting and vomited however steroids as well as Tylenol.    Of note, patient has a hypertensive patient.  To address her nasal congestion she has been taking Sudafed.  She also took a dose of pseudoephedrine yesterday           History provided by:  Patient   used: No       Physical Exam   Triage vitals:  T 36.7 °C (98.1 °F)  HR (!) 120  BP (!) 153/108  RR 18  O2 97 % None  (Room air)    Exam    Physical Exam  Constitutional:       Appearance: Normal appearance.   HENT:      Head: Normocephalic and atraumatic.      Mouth/Throat:      Mouth: Mucous membranes are moist.   Cardiovascular:      Rate and Rhythm: Normal rate and regular rhythm.      Heart sounds: No murmur heard.     No friction rub. No gallop.   Pulmonary:      Breath sounds: Normal breath sounds. No stridor. No wheezing or rhonchi.   Abdominal:      General: Abdomen is flat. Bowel sounds are normal.      Palpations: Abdomen is soft. There is no mass.      Tenderness: There is no abdominal tenderness. There is no guarding.   Musculoskeletal:         General: No swelling or tenderness. Normal range of motion.      Cervical back: Normal range of motion.   Neurological:      General: No focal deficit present.      Mental Status: She is alert and oriented to person, place, and time.   Psychiatric:         Mood and Affect: Mood normal.         Behavior: Behavior normal.        Medical Decision Making & ED Course   Medical Decision Makin y.o. female with past medical history as mentioned above who came in on account of nasal congestion, sneezing, sore throat as well as nausea and vomiting.  The whole course started on Monday leading up to today when patient developed episodes of nausea and vomiting.  Patient went to the urgent care earlier in the day where she was diagnosed with rhinovirus and was sent home on the steroids.  Patient took the steroids as well as Tylenol and then afterwards had another episode of vomiting.  She decided to come to the ED to get it addressed. Gave her oral Zofran as well as oral Tylenol 975 mg. In ED, patient heart rate as well as blood pressure was seen to be elevated. Patient did endorse she has taken pseudoephedrine to help with the nasal congestion, last dose yesterday.  Given her history of hypertension, I had discussion with patient recommending to not take Sudafed.  I recommended  Coricidin that contains Tylenol as well as chlorphentermine to help with the nasal congestion.  Alternatively she can just use cetirizine/loratadine for nasal congestion.  For her headaches, myalgias I recommended Tylenol 500 mg every 6 hours alternating with ibuprofen 200 to 400 mg every 6 hours.  She can alternate between the 2 medications every 3 hours.  Will be sending her home with a prescription for Zofran.  Prior to discharge, we will do an oral hydration challenge by giving patient oral electrolyte solution.  Once patient was able to tolerate her oral rehydration solution, her heart rate getting down to 103, patient was discharged home  ----    Differential diagnoses considered include but are not limited to: Upper respiratory tract infection secondary to the rhinovirus     Social Determinants of Health which Significantly Impact Care: None identified     EKG Independent Interpretation: EKG interpreted by myself. Please see ED Course for full interpretation.    Independent Result Review and Interpretation: None obtained    Chronic conditions affecting the patient's care: As documented above in [facility]    The patient was discussed with the following consultants/services: None    Care Considerations: As documented above in [facility]    ED Course:  ED Course as of 04/16/25 1939 Wed Apr 16, 2025   1820 Heart Rate(!): 120  Will get an EKG [OK]   1838 BP(!): 153/108  Secondary to pseudoephedrine use [OK]   1844 ECG 12 lead  EKG obtained at 6:45 PM: Normal sinus rhythm without ST segment elevation or T wave inversion.  Ventricular rate of 116 with QTc 461. [OK]   1845 Given Tylenol 975 mg oral once [OK]   1846 QTc 461: Given Zofran ODT 4 mg  [OK]   1849 ED Attending Attestation: 45-year-old female with history of hypertension presenting to the emergency department with flulike symptoms for the past 3 days.  Went to urgent care earlier today, diagnosed with rhinovirus, discharged on a Medrol Dosepak.  Use Sudafed yesterday,  has used Tylenol as well today.  States that she had an episode of emesis after coughing this afternoon so she came to the ED.  Currently feels congested, sore throat.  Has a mild cough, states it is nonproductive.  Denies chest pain or shortness of breath.  Denies abdominal pain.  Physical exam notes clear lungs, benign abdominal exam.  Nontoxic-appearing patient.  Reviewed note from urgent care today, reviewed rhinovirus positive test.  Considered obtaining a chest x-ray however she has clear lungs, I do not think she needs a chest x-ray to look for pneumonia at this time.  Consider blood work, but she is nontoxic-appearing, does not appear clinically dehydrated.  We will treat her with Zofran ODT, Tylenol, oral rehydration and likely discharge home. [SH]   1850 Oral hydration solution 250 mL ordered [OK]   1851 ECG 12 lead  EKG obtained at 1843 that demonstrates sinus tachycardia, ventricular rate of 116, normal axis, normal intervals, no ST segment or T wave signs of ischemia. [SH]   1938 Heart Rate(!): 103  Acceptable heart rate [OK]   1938 Patient feeling safe to be discharged home.  Will be discharging her home [OK]      ED Course User Index  [OK] Emanuel Davis MD  [SH] Andrea Sanchez MD       Disposition   Discharge home    Procedures   Procedures    Patient seen and discussed with ED attending physician.    Emanuel Davis MD  Internal Medicine, PGY- 2  04/16/25 at 7:39 PM        Emanuel Davis MD  Resident  04/16/25 1939

## 2025-04-16 NOTE — ED TRIAGE NOTES
Patient c/o congestion, cough, runny nose, headache and vomiting that started today, patient was seen at urgent care and tested positive for rhinovirus, she was discharged with meds but came to the ED because she felt like they did not do much for her.

## 2025-04-16 NOTE — PROGRESS NOTES
Subjective   Patient ID: Zoe Wallace is a 45 y.o. female. They present today with a chief complaint of Sore Throat (Pt c/o sore throat, congestion for 1 day).    History of Present Illness  Patient is a 45-year-old female who presents with sore throat and congestion for past day.  States that she has tried multiple over-the-counter cold and flu medicines and Nasacort with little relief.  Denies any fevers, chills.    Past Medical History  Allergies as of 04/16/2025 - Reviewed 04/16/2025   Allergen Reaction Noted    Amoxicillin-pot clavulanate Other, Diarrhea, Nausea Only, and Unknown 03/04/2023    Cat dander Unknown 09/13/2023    Doxycycline Hives 02/05/2024    House dust Unknown 09/13/2023    House dust mite Unknown 09/13/2023    Mold Unknown 09/13/2023    Moxifloxacin Hives, Other, and Swelling 03/04/2023    Pollen extracts Other 12/15/2020    Ragweed Unknown 09/13/2023    Sulfamethoxazole-trimethoprim Hives, Swelling, and Other 01/22/2017    Tree and shrub pollen Unknown 09/13/2023    Clarithromycin Itching and Rash 04/16/2024       Prescriptions Prior to Admission[1]     Medical History[2]    Surgical History[3]     reports that she has quit smoking. Her smoking use included cigarettes. She has never used smokeless tobacco. She reports that she does not currently use alcohol. She reports that she does not currently use drugs.    Review of Systems  ROS is negative unless otherwise stated in HPI.       Objective    Vitals:    04/16/25 1014   BP: (!) 150/96   BP Location: Left arm   Patient Position: Sitting   BP Cuff Size: Large adult   Pulse: 86   Resp: 20   Temp: 36.8 °C (98.2 °F)   TempSrc: Oral   SpO2: 97%   Weight: 85.3 kg (188 lb)     No LMP recorded. Patient has had an injection.      VS: As documented in the triage note and EMR flowsheet from this visit was reviewed  General: Well appearing. No acute distress.   Eyes:  Extraocular movements grossly intact. No scleral icterus.   Head: Atraumatic.  Normocephalic.     Neck: No meningismus. No gross masses. Full movement through range of motion  ENT: Posterior oropharynx shows no erythema, exudate or edema.  Uvula is midline without edema.  No stridor or trismus  CV: Regular rhythm. No murmurs, rubs, gallops appreciated.   Resp: Clear to auscultation bilaterally. No respiratory distress.    Skin: Warm, dry. No rashes  Neuro: CN II-VII intact. A&O x3. Speech fluent. Alert. Moving all extremities. Ambulates with normal gait  Psych: Appropriate mood and affect for situation      Point of Care Test & Imaging Results from this visit  Results for orders placed or performed in visit on 04/16/25   POCT SPOTFIRE R/ST Panel Mini w/Strep A ([employer]) manually resulted   Result Value Ref Range    POC Group A Strep, PCR Negative Negative    POC Respiratory Syncytial Virus PCR Negative Negative    POC Influenza A Virus PCR Negative Negative    POC Influenza B Virus PCR Negative Negative    POC Human Rhinovirus PCR Positive (A) Negative      Imaging  No results found.    Cardiology, Vascular, and Other Imaging  No other imaging results found for the past 2 days      Diagnostic study results (if any) were reviewed by Abbey Lezama PA-C.    Assessment/Plan   Allergies, medications, history, and pertinent labs/EKGs/Imaging reviewed by Abbey Lezama PA-C.     Medical Decision Making  Patient is a 45-year-old female presents for sore throat and congestion.  On examination, patient well-appearing.  Vitals are stable.  Posterior oropharynx unremarkable in appearance.  She endorses nasal congestion with no relief with over-the-counter medications.  Spot fire testing is positive for rhinovirus.  Advised on conservative management. Will treat with medrol dosepak. Patient informed of the diagnosis.  They are agreeable to the plan as discussed above.  Patient given the opportunity to ask questions.  All of the patient's questions were answered. Given precautions in which to seek  attention in the emergency department. Discussed follow up with PCP or other appropriate clinician.      Orders and Diagnoses  Diagnoses and all orders for this visit:  Sore throat  -     POCT SPOTFIRE R/ST Panel Mini w/Strep A ([facility]) manually resulted  Nasal congestion  -     methylPREDNISolone (Medrol Dospak) 4 mg tablets; Follow schedule on package instructions      Medical Admin Record      Patient disposition: Home    Electronically signed by Abbey Lezama PA-C  12:01 PM           [1] (Not in a hospital admission)   [2]   Past Medical History:  Diagnosis Date    Acute upper respiratory infection, unspecified 08/05/2019    Acute URI    Chronic ethmoidal sinusitis 07/15/2019    Ethmoid sinusitis    Chronic maxillary sinusitis 06/14/2019    Right maxillary sinusitis    Encounter for screening for malignant neoplasm of vagina     Vaginal Pap smear    Gastro-esophageal reflux disease without esophagitis 03/27/2019    Gastroesophageal reflux disease, esophagitis presence not specified    Lesion of ulnar nerve, unspecified upper limb 02/11/2014    Ulnar nerve impingement    Other conditions influencing health status     Menstruation    Otitis media, unspecified, right ear 03/14/2019    Right otitis media    Personal history of other (healed) physical injury and trauma 06/19/2014    History of sprain    Personal history of other diseases of the digestive system 07/16/2014    History of irritable bowel syndrome    Personal history of other diseases of the nervous system and sense organs 04/25/2014    History of carpal tunnel syndrome    Personal history of other diseases of the respiratory system 04/25/2014    Personal history of acute sinusitis    Personal history of other diseases of the respiratory system 06/04/2013    History of extrinsic asthma    Personal history of other diseases of the respiratory system 09/29/2020    History of nasal polyp    Personal history of other diseases of the respiratory system  07/14/2015    History of acute pharyngitis    Personal history of other endocrine, nutritional and metabolic disease 07/16/2014    History of hypothyroidism    Personal history of other infectious and parasitic diseases 12/10/2018    History of candidiasis of mouth    Personal history of other specified conditions 12/14/2021    History of nasal congestion    Personal history of other specified conditions     History of snoring    Personal history of other specified conditions     History of heartburn    Unspecified injury of unspecified ankle, initial encounter 06/19/2014    Ankle injury    Unspecified otitis externa, right ear 03/14/2019    Right otitis externa    Unspecified otitis externa, unspecified ear 03/18/2019    Otitis externa   [3]   Past Surgical History:  Procedure Laterality Date    OTHER SURGICAL HISTORY  04/15/2014    Wrist Surgery    SINUS SURGERY  09/13/2013    Sinus Surgery    TOE SURGERY Right 12/2023

## 2025-04-16 NOTE — DISCHARGE INSTRUCTIONS
Use Tylenol 500 mg every 6 hours.  You can alternate it with ibuprofen 400 to 600 mg every 6 hours as needed.  You can alternate the 2 medications every 3 hours  For your nasal congestion please avoid using pseudoephedrine.  You can use cetirizine/loratadine  For your sore throat you can use sore throat sprays or lozenges  You can stop using the steroid Medrol pack given to you   You have been given a prescription for Zofran, you can use it for your nausea and/or vomiting as needed

## 2025-04-18 LAB
ATRIAL RATE: 116 BPM
P AXIS: 40 DEGREES
P OFFSET: 205 MS
P ONSET: 157 MS
PR INTERVAL: 132 MS
Q ONSET: 223 MS
QRS COUNT: 19 BEATS
QRS DURATION: 74 MS
QT INTERVAL: 332 MS
QTC CALCULATION(BAZETT): 461 MS
QTC FREDERICIA: 413 MS
R AXIS: -1 DEGREES
T AXIS: 22 DEGREES
T OFFSET: 389 MS
VENTRICULAR RATE: 116 BPM

## 2025-04-21 ENCOUNTER — PATIENT MESSAGE (OUTPATIENT)
Dept: PRIMARY CARE | Facility: CLINIC | Age: 45
End: 2025-04-21
Payer: COMMERCIAL

## 2025-04-21 DIAGNOSIS — J01.90 ACUTE RHINOSINUSITIS: Primary | ICD-10-CM

## 2025-04-22 RX ORDER — BENZONATATE 200 MG/1
200 CAPSULE ORAL 3 TIMES DAILY PRN
Qty: 30 CAPSULE | Refills: 0 | Status: SHIPPED | OUTPATIENT
Start: 2025-04-22 | End: 2025-05-22

## 2025-04-23 ENCOUNTER — OFFICE VISIT (OUTPATIENT)
Dept: PRIMARY CARE | Facility: CLINIC | Age: 45
End: 2025-04-23
Payer: COMMERCIAL

## 2025-04-23 VITALS
BODY MASS INDEX: 30.18 KG/M2 | TEMPERATURE: 100.3 F | DIASTOLIC BLOOD PRESSURE: 100 MMHG | HEART RATE: 91 BPM | SYSTOLIC BLOOD PRESSURE: 138 MMHG | WEIGHT: 187 LBS | OXYGEN SATURATION: 98 %

## 2025-04-23 DIAGNOSIS — E66.9 OBESITY (BMI 30-39.9): ICD-10-CM

## 2025-04-23 DIAGNOSIS — G43.009 MIGRAINE WITHOUT AURA, NOT REFRACTORY: ICD-10-CM

## 2025-04-23 DIAGNOSIS — D89.89 MANNOSE-BINDING LECTIN DEFICIENCY (MULTI): ICD-10-CM

## 2025-04-23 DIAGNOSIS — J30.9 ALLERGIC RHINITIS, UNSPECIFIED SEASONALITY, UNSPECIFIED TRIGGER: ICD-10-CM

## 2025-04-23 DIAGNOSIS — J45.20 MILD INTERMITTENT ASTHMA WITHOUT COMPLICATION (HHS-HCC): ICD-10-CM

## 2025-04-23 DIAGNOSIS — I10 PRIMARY HYPERTENSION: Primary | ICD-10-CM

## 2025-04-23 PROCEDURE — 3075F SYST BP GE 130 - 139MM HG: CPT | Performed by: FAMILY MEDICINE

## 2025-04-23 PROCEDURE — 1036F TOBACCO NON-USER: CPT | Performed by: FAMILY MEDICINE

## 2025-04-23 PROCEDURE — 99214 OFFICE O/P EST MOD 30 MIN: CPT | Performed by: FAMILY MEDICINE

## 2025-04-23 PROCEDURE — RXMED WILLOW AMBULATORY MEDICATION CHARGE

## 2025-04-23 PROCEDURE — 3080F DIAST BP >= 90 MM HG: CPT | Performed by: FAMILY MEDICINE

## 2025-04-23 RX ORDER — SEMAGLUTIDE 2.4 MG/.75ML
2.4 INJECTION, SOLUTION SUBCUTANEOUS WEEKLY
Qty: 9 ML | Refills: 1 | Status: SHIPPED | OUTPATIENT
Start: 2025-04-23 | End: 2025-07-18

## 2025-04-23 RX ORDER — HYDROCHLOROTHIAZIDE 25 MG/1
25 TABLET ORAL DAILY
Qty: 90 TABLET | Refills: 1 | Status: SHIPPED | OUTPATIENT
Start: 2025-04-23

## 2025-04-23 ASSESSMENT — ENCOUNTER SYMPTOMS
LOSS OF SENSATION IN FEET: 0
DEPRESSION: 0
OCCASIONAL FEELINGS OF UNSTEADINESS: 0

## 2025-04-23 ASSESSMENT — LIFESTYLE VARIABLES
HOW MANY STANDARD DRINKS CONTAINING ALCOHOL DO YOU HAVE ON A TYPICAL DAY: PATIENT DOES NOT DRINK
SKIP TO QUESTIONS 9-10: 1
HOW OFTEN DO YOU HAVE SIX OR MORE DRINKS ON ONE OCCASION: NEVER
HOW OFTEN DO YOU HAVE A DRINK CONTAINING ALCOHOL: NEVER
AUDIT-C TOTAL SCORE: 0

## 2025-04-23 ASSESSMENT — PATIENT HEALTH QUESTIONNAIRE - PHQ9
SUM OF ALL RESPONSES TO PHQ9 QUESTIONS 1 AND 2: 0
1. LITTLE INTEREST OR PLEASURE IN DOING THINGS: NOT AT ALL
2. FEELING DOWN, DEPRESSED OR HOPELESS: NOT AT ALL

## 2025-04-23 ASSESSMENT — PAIN SCALES - GENERAL: PAINLEVEL_OUTOF10: 0-NO PAIN

## 2025-04-23 NOTE — PATIENT INSTRUCTIONS
Patient is here for follow up.     For hypertension - elevated - likely due to illness.  Increase hydrochlorothiazide to 25mg (2 tabs of 12.5mg).  Seeing Dr. Blancas next week.  Please check blood pressure 2-3 times a week. Goal is <140/90. If you are above this frequently, please call for medication adjustment. Recommend upper arm electronic cuff.    For weight loss - doing well with wegovy- tolerating wegovy.  Losing weight.  Continue medication    For migraines - doing well with Ajovy and Nurtec.  Tolerable.  Migraine frequency and intensity are improved.      For allergies - seeing ENT.  If unable to get allergy shot there, then we will refer to Dr. Gamez.      Follow up in 6 months for physical.

## 2025-04-23 NOTE — PROGRESS NOTES
Subjective   Patient ID: Zoe Wallace is a 45 y.o. female who presents for 6 MO F/U.    Here fro follow up.     Hypertension  -Patient is here for follow-up of elevated blood pressure.  Has been running high.  Pt is on amlodipine, losartan and hydrochlorothiazide.  Pt has follow up with Dr. Blancas.  Pt has been running.  Pt has been losing weight - on ozempic.  Tolerating well.  Affordable.    -Blood pressure is well controlled.  -Cardiac symptoms: none  -Patient denies chest pain, SOB  -Cardiologist: Dr. Blancas    Anxiety  -F/U: Doing well.  Sleeping well.  Cymbalta - helping mood.    -Symptoms have been stable    -She denies current suicidal and homicidal ideation.    -Current Treatment: sertraline  -Counseling: none  -Previous treatment includes:     Headache:  -Type of headache: migraine without aura  -Symptoms:  unilateral, pounding headache. Headaches have been manageable.  Pt is now on ajovy - once a month.      -Frequency: 4-5 times a month  -Associated symptoms:  -Treatment: nurtec, , Ajovy and maxalt  -Specialist:  Dr. Simeon  -Past Medications:        Propranolol - sedation    Obstructed Sleep Apnea  -F/U: Pt has NIKOLAI - pt has oral appliance.  Still snoring.  Still feeling tired throughout the day.    -Compliance: wearing nightly  -Response:  fair  -Specialist:     Asthma:  -F/U: Pt was in ER last wed.  Pt had rhinovirus.  Treating symptomatically.  Symptoms for 1 week.  Pt is immunocompromised.  Using tessalon for cough.  Also using elderberry syrup.    -Medications Using: albuterol, singulair  -Past Medications:  -Frequency of Symptoms: < 2 times a week  -Rescue Inhaler Use: < 2 times a week  -Nocturnal Symptoms: none  -Prednisone Use: none  -Specialist:             Review of Systems    Objective   BP (!) 138/100 (BP Location: Right arm, Patient Position: Sitting)   Pulse 91   Temp (!) 37.9 °C (100.3 °F) (Temporal)   Wt 84.8 kg (187 lb)   SpO2 98%   BMI 30.18 kg/m²     Physical Exam  Vitals  reviewed.   Constitutional:       General: She is not in acute distress.  Cardiovascular:      Rate and Rhythm: Normal rate and regular rhythm.   Pulmonary:      Effort: Pulmonary effort is normal.      Breath sounds: No wheezing or rhonchi.   Musculoskeletal:      Right lower leg: No edema.      Left lower leg: No edema.   Lymphadenopathy:      Cervical: No cervical adenopathy.   Neurological:      Mental Status: She is alert.         Assessment/Plan   Diagnoses and all orders for this visit:  Primary hypertension  Mild intermittent asthma without complication (HHS-HCC)  Mannose-binding lectin deficiency (Multi)  Migraine without aura, not refractory  Obesity (BMI 30-39.9)  Allergic rhinitis, unspecified seasonality, unspecified trigger  Other orders  -     hydroCHLOROthiazide (HYDRODiuril) 25 mg tablet; Take 1 tablet (25 mg) by mouth once daily. For 90 days    Patient Instructions   Patient is here for follow up.     For hypertension - elevated - likely due to illness.  Increase hydrochlorothiazide to 25mg (2 tabs of 12.5mg).  Seeing Dr. Blancas next week.  Please check blood pressure 2-3 times a week. Goal is <140/90. If you are above this frequently, please call for medication adjustment. Recommend upper arm electronic cuff.    For weight loss - doing well with wegovy- tolerating wegovy.  Losing weight.  Continue medication    For migraines - doing well with Ajovy and Nurtec.  Tolerable.  Migraine frequency and intensity are improved.      For allergies - seeing ENT.  If unable to get allergy shot there, then we will refer to Dr. Gamez.      Follow up in 6 months for physical.

## 2025-04-26 ENCOUNTER — SPECIALTY PHARMACY (OUTPATIENT)
Dept: PHARMACY | Facility: CLINIC | Age: 45
End: 2025-04-26

## 2025-04-26 PROCEDURE — RXMED WILLOW AMBULATORY MEDICATION CHARGE

## 2025-04-29 ENCOUNTER — PHARMACY VISIT (OUTPATIENT)
Dept: PHARMACY | Facility: CLINIC | Age: 45
End: 2025-04-29
Payer: COMMERCIAL

## 2025-05-02 ENCOUNTER — PHARMACY VISIT (OUTPATIENT)
Dept: PHARMACY | Facility: CLINIC | Age: 45
End: 2025-05-02
Payer: COMMERCIAL

## 2025-05-05 PROCEDURE — RXMED WILLOW AMBULATORY MEDICATION CHARGE

## 2025-05-06 ENCOUNTER — PHARMACY VISIT (OUTPATIENT)
Dept: PHARMACY | Facility: CLINIC | Age: 45
End: 2025-05-06
Payer: COMMERCIAL

## 2025-05-15 ENCOUNTER — APPOINTMENT (OUTPATIENT)
Dept: OTOLARYNGOLOGY | Facility: CLINIC | Age: 45
End: 2025-05-15
Payer: COMMERCIAL

## 2025-05-15 ENCOUNTER — APPOINTMENT (OUTPATIENT)
Dept: AUDIOLOGY | Facility: CLINIC | Age: 45
End: 2025-05-15
Payer: COMMERCIAL

## 2025-05-15 VITALS — HEIGHT: 66 IN | BODY MASS INDEX: 29.89 KG/M2 | WEIGHT: 186 LBS | TEMPERATURE: 97.9 F

## 2025-05-15 DIAGNOSIS — Z01.10 ENCOUNTER FOR HEARING EXAMINATION WITHOUT ABNORMAL FINDINGS: Primary | ICD-10-CM

## 2025-05-15 DIAGNOSIS — H90.3 BILATERAL SENSORINEURAL HEARING LOSS: Primary | ICD-10-CM

## 2025-05-15 PROCEDURE — 1036F TOBACCO NON-USER: CPT | Performed by: OTOLARYNGOLOGY

## 2025-05-15 PROCEDURE — 99203 OFFICE O/P NEW LOW 30 MIN: CPT | Performed by: OTOLARYNGOLOGY

## 2025-05-15 PROCEDURE — 3008F BODY MASS INDEX DOCD: CPT | Performed by: OTOLARYNGOLOGY

## 2025-05-15 PROCEDURE — 92550 TYMPANOMETRY & REFLEX THRESH: CPT | Performed by: AUDIOLOGIST

## 2025-05-15 PROCEDURE — 92557 COMPREHENSIVE HEARING TEST: CPT | Performed by: AUDIOLOGIST

## 2025-05-15 NOTE — PROGRESS NOTES
AUDIOLOGY  AUDIOMETRIC EVALUATION    Name:  Zoe Wallace  :  1980  Age:  45 y.o.  Date of Evaluation:  May 15, 2025    Reason for visit: Ms. Wallace is seen in the clinic today at the request of Josiah Bazan MD in otolaryngology for an audiologic evaluation. Patient complains of right worse than left ear issues. Denies difficulty hearing. Most recent audiologic evaluation performed on 2019 revealed essentially normal hearing sensitivity bilaterally.    AUDIOLOGIC EVALUATION  See scanned audiogram: “Media” > “Audiology Report” > Document ID [id number].    Objective   Otoscopic Evaluation  Right Ear: minimal non-occluding cerumen with visualization of the tympanic membrane  Left Ear: minimal non-occluding cerumen with visualization of the tympanic membrane    Immittance Measures  Tympanometry:  Right Ear: Type A, normal tympanic membrane mobility with normal middle ear pressure  Left Ear: Type A, normal tympanic membrane mobility with normal middle ear pressure    Acoustic Reflexes:  Ipsilateral Right Ear: present and normal from 500 Hz to 4000 Hz  Ipsilateral Left Ear: present and normal from 500 Hz to 4000 Hz  Contralateral Right Ear: did not evaluate  Contralateral Left Ear: did not evaluate    Distortion Product Otoacoustic Emissions (DPOAEs)  Right Ear: present from 3000 Hz to 5000 Hz, absent from 1000 Hz to 2000 Hz and from 6000 Hz to 8000 Hz  Left Ear: present from 2000 Hz to 6000 Hz, absent from 1000 Hz to 1500 Hz and at 8000 Hz    Audiometry  Test Technique and Reliability:   Standard audiometry via supra-aural headphones. Pulsed tone stimulus. Reliability is good.    Pure tone air and bone conduction audiometry:  Right Ear: normal hearing sensitivity  Left Ear: normal hearing sensitivity    Speech Audiometry:  Speech Reception Threshold (SRT) Right Ear: 20 dB HL   Speech Reception Threshold (SRT) Left Ear: 20 dB HL   Word Recognition Score (WRS) Right Ear: Excellent, 96% at 60 dB HL    Word Recognition Score (WRS) Left Ear: Excellent, 96% at 60 dB HL      IMPRESSIONS  Audiometric evaluation revealed normal hearing sensitivity bilaterally. Tympanometry indicates normal tympanic membrane mobility with normal middle ear pressure (Type A) bilaterally. The presence of acoustic reflexes within normal intensity limits is consistent with normal middle ear and brainstem function, and suggests that auditory sensitivity is not significantly impaired. Present Distortion Product Otoacoustic Emissions (DPOAEs) suggest normal/near normal cochlear outer hair cell function and are consistent with no greater than a mild hearing loss at those frequencies. Results show slight improvement compared with 04/23/2019 evaluation.    RECOMMENDATIONS  - Follow up with otolaryngology today as scheduled.  - Annual audiologic evaluation, sooner if an acute change is noted.  - Continue use of hearing protective devices when in loud, impact, and/or excessive noise.  - Follow-up with medical care team as planned.    PATIENT EDUCATION  Discussed results, impressions and recommendations with the patient. Questions were addressed and the patient was encouraged to contact our office should concerns arise.    Time for this encounter: 1424-3790    Analia Hyman, CCC-A  Licensed Audiologist

## 2025-05-15 NOTE — PROGRESS NOTES
"Chief Complaint   Patient presents with    New Patient Visit     LOV 4/2019 DECREASE HEARING, F/U AUDIO     HPI:  Zoe Wallace is a 45 y.o. female who presents today for evaluation of her ears.  Has a known history of some mild chronic bilateral sensorineural hearing loss which is present on audiogram today.  Excellent discrimination scores and type a tympanograms.  She has a history of multiple sets of tubes as well as 3 sinus surgeries in the past.  Complains of some hearing loss as well as some ears popping at times.    PMH:  Medical History[1]  Surgical History[2]      Medications:   Current Medications[3]     Allergies:  Allergies[4]     ROS:  Review of systems normal unless stated otherwise in the HPI and/or PMH.    Physical Exam:  Temperature 36.6 °C (97.9 °F), height 1.676 m (5' 6\"), weight 84.4 kg (186 lb). Body mass index is 30.02 kg/m².     GENERAL APPEARANCE: Well developed and well nourished.  Alert and oriented in no acute distress.  Normal vocal quality.      HEAD/FACE: No erythema or edema or facial tenderness.  Normal facial nerve function bilaterally.    EAR:       EXTERNAL: Normal pinnas and external auditory canals without lesion or obstructing wax.       MIDDLE EAR: Tympanic membranes intact and mobile with normal landmarks.  Middle ear space appears well aerated.  Bilateral sclerosis without any middle ear effusion       TUBE STATUS: N/A       MASTOID CAVITY: N/A       HEARING: Gross hearing assessment is within normal limits.      NOSE:       VISUALIZED USING: Anterior rhinoscopy with headlight and nasal speculum.       DORSUM: Midline, nontraumatic appearance.       MUCOSA: Normal-appearing.       SECRETIONS: Normal.       SEPTUM: Midline and nonobstructing.       INFERIOR TURBINATES: Normal.       MIDDLE TURBINATES/MEATUS: N/A       BLEEDING: N/A         ORAL CAVITY/PHARYNX:       TEETH: Adequate dentition.       TONGUE: No mass or lesion.  Normal mobility.       FLOOR OF MOUTH: No mass " or lesion.       PALATE: Normal hard palate, soft palate, and uvula.       OROPHARYNX: Normal without mass or lesion.       BUCCAL MUCOSA/GBS: Normal without mass or lesion.       LIPS: Normal.    LARYNX/HYPOPHARYNX/NASOPHARYNX: N/A    NECK: No palpable masses or abnormal adenopathy.  Trachea is midline.    THYROID: No thyromegaly or palpable nodule.    SALIVARY GLANDS: Normal bilateral parotid and submandibular glands by inspection and palpation.    TMJ's: Normal.    NEURO: Cranial nerve exam grossly normal bilaterally.       Assessment/Plan   Zoe was seen today for new patient visit.  Diagnoses and all orders for this visit:  Bilateral sensorineural hearing loss (Primary)     Reassured.  Does not need any treatment other than some Flonase may be beneficial for some recurrent sinus issues and some pressure in her ears on occasion.  Recommend hearing protection and follow-up periodically for new hearing test  Follow up if symptoms worsen or fail to improve.     Josiah Bazan MD         [1]   Past Medical History:  Diagnosis Date    Acute upper respiratory infection, unspecified 08/05/2019    Acute URI    Chronic ethmoidal sinusitis 07/15/2019    Ethmoid sinusitis    Chronic maxillary sinusitis 06/14/2019    Right maxillary sinusitis    Encounter for screening for malignant neoplasm of vagina     Vaginal Pap smear    Gastro-esophageal reflux disease without esophagitis 03/27/2019    Gastroesophageal reflux disease, esophagitis presence not specified    Lesion of ulnar nerve, unspecified upper limb 02/11/2014    Ulnar nerve impingement    Other conditions influencing health status     Menstruation    Otitis media, unspecified, right ear 03/14/2019    Right otitis media    Personal history of other (healed) physical injury and trauma 06/19/2014    History of sprain    Personal history of other diseases of the digestive system 07/16/2014    History of irritable bowel syndrome    Personal history of other  diseases of the nervous system and sense organs 04/25/2014    History of carpal tunnel syndrome    Personal history of other diseases of the respiratory system 04/25/2014    Personal history of acute sinusitis    Personal history of other diseases of the respiratory system 06/04/2013    History of extrinsic asthma    Personal history of other diseases of the respiratory system 09/29/2020    History of nasal polyp    Personal history of other diseases of the respiratory system 07/14/2015    History of acute pharyngitis    Personal history of other endocrine, nutritional and metabolic disease 07/16/2014    History of hypothyroidism    Personal history of other infectious and parasitic diseases 12/10/2018    History of candidiasis of mouth    Personal history of other specified conditions 12/14/2021    History of nasal congestion    Personal history of other specified conditions     History of snoring    Personal history of other specified conditions     History of heartburn    Unspecified injury of unspecified ankle, initial encounter 06/19/2014    Ankle injury    Unspecified otitis externa, right ear 03/14/2019    Right otitis externa    Unspecified otitis externa, unspecified ear 03/18/2019    Otitis externa   [2]   Past Surgical History:  Procedure Laterality Date    OTHER SURGICAL HISTORY  04/15/2014    Wrist Surgery    SINUS SURGERY  09/13/2013    Sinus Surgery    TOE SURGERY Right 12/2023   [3]   Current Outpatient Medications:     albuterol 90 mcg/actuation inhaler, Inhale 2 puffs 4 times a day as needed., Disp: , Rfl:     amLODIPine (Norvasc) 5 mg tablet, Take 1 tablet (5 mg) by mouth once daily., Disp: 30 tablet, Rfl: 0    ascorbic acid (Vitamin C) 500 mg tablet, Take 1 tablet (500 mg) by mouth., Disp: , Rfl:     calcium carbonate (Oscal) 500 mg calcium (1,250 mg) tablet, Take 1 tablet by mouth 2 times daily (morning and late afternoon)., Disp: , Rfl:     cetirizine (ZyrTEC) 10 mg tablet, Take 1 tablet (10  mg) by mouth once daily. FOR 90 DAYS for 90, Disp: , Rfl:     cholecalciferol (Vitamin D-3) 50 MCG (2000 UT) tablet, Take 1 tablet (50 mcg) by mouth once daily., Disp: , Rfl:     cyanocobalamin (Vitamin B-12) 100 mcg tablet, Take by mouth., Disp: , Rfl:     famotidine (Pepcid) 40 mg tablet, TAKE 1 TABLET BY MOUTH 2 TIMES A DAY, Disp: 180 tablet, Rfl: 1    fluticasone (Flonase) 50 mcg/actuation nasal spray, Administer 1 spray into each nostril once daily. Shake gently. Before first use, prime pump. After use, clean tip and replace cap., Disp: 48 g, Rfl: 1    fremanezumab (Ajovy) 225 mg/1.5 mL auto-injector, Inject 1 Pen (225 mg) under the skin every 28 (twenty-eight) days., Disp: 4.5 mL, Rfl: 1    hydroCHLOROthiazide (HYDRODiuril) 25 mg tablet, Take 1 tablet (25 mg) by mouth once daily. For 90 days, Disp: 90 tablet, Rfl: 1    losartan (Cozaar) 100 mg tablet, Take 1 tablet (100 mg) by mouth once daily., Disp: , Rfl:     magnesium carb,citrate,oxide (MAGNESIUM COMPLEX ORAL), Take 500 mg by mouth once daily., Disp: , Rfl:     metoclopramide (Reglan) 10 mg tablet, Take 1 tablet (10 mg) by mouth 4 times a day., Disp: 30 tablet, Rfl: 1    montelukast (Singulair) 10 mg tablet, TAKE 1 TABLET BY MOUTH EVERY DAY, Disp: 90 tablet, Rfl: 1    Nurtec ODT 75 mg tablet,disintegrating, Dissolve 1 tablet (75 mg) in the mouth once daily as needed (migraine)., Disp: 8 tablet, Rfl: 11    ondansetron ODT (Zofran-ODT) 4 mg disintegrating tablet, Dissolve 1 tablet (4 mg) in the mouth every 6 hours if needed for nausea or vomiting., Disp: 12 tablet, Rfl: 0    rizatriptan (Maxalt) 10 mg tablet, Take 1 tablet (10 mg) by mouth 1 time if needed for migraine (may repeat x1). May repeat in 2 hours if unresolved. Do not exceed 30 mg in 24 hours. Replaces sumatriptan., Disp: 9 tablet, Rfl: 11    semaglutide, weight loss, (Wegovy) 2.4 mg/0.75 mL pen injector, Inject 2.4 mg under the skin 1 (one) time per week for 4 doses., Disp: 9 mL, Rfl: 1     triamcinolone (Nasacort) 55 mcg nasal inhaler, Administer 2 sprays into affected nostril(s) once daily., Disp: , Rfl:     zinc gluconate 50 mg tablet, Take 1 tablet by mouth once daily., Disp: , Rfl:     benzonatate (Tessalon) 200 mg capsule, Take 1 capsule (200 mg) by mouth 3 times a day as needed for cough. Do not crush or chew. (Patient not taking: Reported on 5/15/2025), Disp: 30 capsule, Rfl: 0    clobetasol (Temovate) 0.05 % ointment, Apply topically once daily.  APPLY SPARINGLY TO AFFECTED AREA (Patient not taking: Reported on 5/15/2025), Disp: 30 g, Rfl: 0    triamcinolone acetonide 0.05 % ointment, Apply topically 2 times a day.  APPLY TO AFFECTED AREA EXTERNALLY (Patient not taking: Reported on 5/15/2025), Disp: , Rfl:   [4]   Allergies  Allergen Reactions    Amoxicillin-Pot Clavulanate Other, Diarrhea, Nausea Only and Unknown     GI: Nausea/Vomiting    Cat Dander Unknown    Doxycycline Hives    House Dust Unknown    House Dust Mite Unknown    Mold Unknown    Moxifloxacin Hives, Other and Swelling     bumps on tongue    Pollen Extracts Other    Ragweed Unknown    Sulfamethoxazole-Trimethoprim Hives, Swelling and Other     SWELLING OF LIPS; Facial Swelling    Tree And Shrub Pollen Unknown    Clarithromycin Itching and Rash

## 2025-05-16 ENCOUNTER — PATIENT MESSAGE (OUTPATIENT)
Dept: PRIMARY CARE | Facility: CLINIC | Age: 45
End: 2025-05-16
Payer: COMMERCIAL

## 2025-05-19 ENCOUNTER — TELEPHONE (OUTPATIENT)
Dept: PRIMARY CARE | Facility: CLINIC | Age: 45
End: 2025-05-19
Payer: COMMERCIAL

## 2025-05-19 NOTE — TELEPHONE ENCOUNTER
She is having surgery on R knee (tendon repair, remove some cartilage) on June 11 with Dr. Queen and she has paperwork that needs to be filled out. Please call to set up appt.

## 2025-05-20 ENCOUNTER — PATIENT MESSAGE (OUTPATIENT)
Dept: PRIMARY CARE | Facility: CLINIC | Age: 45
End: 2025-05-20
Payer: COMMERCIAL

## 2025-05-20 DIAGNOSIS — Z01.818 PREOPERATIVE EXAMINATION: Primary | ICD-10-CM

## 2025-05-20 DIAGNOSIS — I10 PRIMARY HYPERTENSION: ICD-10-CM

## 2025-05-27 LAB
ANION GAP SERPL CALCULATED.4IONS-SCNC: 10 MMOL/L (CALC) (ref 7–17)
BASOPHILS # BLD AUTO: 92 CELLS/UL (ref 0–200)
BASOPHILS NFR BLD AUTO: 1.1 %
BUN SERPL-MCNC: 17 MG/DL (ref 7–25)
BUN/CREAT SERPL: NORMAL (CALC) (ref 6–22)
CALCIUM SERPL-MCNC: 9.4 MG/DL (ref 8.6–10.2)
CHLORIDE SERPL-SCNC: 107 MMOL/L (ref 98–110)
CO2 SERPL-SCNC: 24 MMOL/L (ref 20–32)
CREAT SERPL-MCNC: 0.7 MG/DL (ref 0.5–0.99)
EGFRCR SERPLBLD CKD-EPI 2021: 108 ML/MIN/1.73M2
EOSINOPHIL # BLD AUTO: 294 CELLS/UL (ref 15–500)
EOSINOPHIL NFR BLD AUTO: 3.5 %
ERYTHROCYTE [DISTWIDTH] IN BLOOD BY AUTOMATED COUNT: 12.7 % (ref 11–15)
GLUCOSE SERPL-MCNC: 68 MG/DL (ref 65–99)
HCT VFR BLD AUTO: 45.9 % (ref 35–45)
HGB BLD-MCNC: 15.5 G/DL (ref 11.7–15.5)
LYMPHOCYTES # BLD AUTO: 2100 CELLS/UL (ref 850–3900)
LYMPHOCYTES NFR BLD AUTO: 25 %
MCH RBC QN AUTO: 32.2 PG (ref 27–33)
MCHC RBC AUTO-ENTMCNC: 33.8 G/DL (ref 32–36)
MCV RBC AUTO: 95.2 FL (ref 80–100)
MONOCYTES # BLD AUTO: 605 CELLS/UL (ref 200–950)
MONOCYTES NFR BLD AUTO: 7.2 %
NEUTROPHILS # BLD AUTO: 5309 CELLS/UL (ref 1500–7800)
NEUTROPHILS NFR BLD AUTO: 63.2 %
PLATELET # BLD AUTO: 298 THOUSAND/UL (ref 140–400)
PMV BLD REES-ECKER: 11.2 FL (ref 7.5–12.5)
POTASSIUM SERPL-SCNC: 4.4 MMOL/L (ref 3.5–5.3)
RBC # BLD AUTO: 4.82 MILLION/UL (ref 3.8–5.1)
SODIUM SERPL-SCNC: 141 MMOL/L (ref 135–146)
WBC # BLD AUTO: 8.4 THOUSAND/UL (ref 3.8–10.8)

## 2025-05-29 ENCOUNTER — OFFICE VISIT (OUTPATIENT)
Dept: PRIMARY CARE | Facility: CLINIC | Age: 45
End: 2025-05-29
Payer: COMMERCIAL

## 2025-05-29 VITALS
TEMPERATURE: 99.3 F | RESPIRATION RATE: 18 BRPM | OXYGEN SATURATION: 99 % | HEART RATE: 105 BPM | DIASTOLIC BLOOD PRESSURE: 90 MMHG | WEIGHT: 182.2 LBS | BODY MASS INDEX: 29.41 KG/M2 | SYSTOLIC BLOOD PRESSURE: 146 MMHG

## 2025-05-29 DIAGNOSIS — Z01.818 PREOPERATIVE EXAMINATION: ICD-10-CM

## 2025-05-29 DIAGNOSIS — D89.89 MANNOSE-BINDING LECTIN DEFICIENCY (MULTI): ICD-10-CM

## 2025-05-29 DIAGNOSIS — G47.33 OSA (OBSTRUCTIVE SLEEP APNEA): ICD-10-CM

## 2025-05-29 DIAGNOSIS — G89.29 CHRONIC PAIN OF RIGHT KNEE: Primary | ICD-10-CM

## 2025-05-29 DIAGNOSIS — M25.561 CHRONIC PAIN OF RIGHT KNEE: Primary | ICD-10-CM

## 2025-05-29 DIAGNOSIS — I10 PRIMARY HYPERTENSION: ICD-10-CM

## 2025-05-29 PROCEDURE — 3077F SYST BP >= 140 MM HG: CPT | Performed by: FAMILY MEDICINE

## 2025-05-29 PROCEDURE — 3080F DIAST BP >= 90 MM HG: CPT | Performed by: FAMILY MEDICINE

## 2025-05-29 PROCEDURE — 1036F TOBACCO NON-USER: CPT | Performed by: FAMILY MEDICINE

## 2025-05-29 PROCEDURE — 99213 OFFICE O/P EST LOW 20 MIN: CPT | Performed by: FAMILY MEDICINE

## 2025-05-29 ASSESSMENT — ENCOUNTER SYMPTOMS
LOSS OF SENSATION IN FEET: 0
OCCASIONAL FEELINGS OF UNSTEADINESS: 0
DEPRESSION: 0

## 2025-05-29 ASSESSMENT — PATIENT HEALTH QUESTIONNAIRE - PHQ9
2. FEELING DOWN, DEPRESSED OR HOPELESS: NOT AT ALL
1. LITTLE INTEREST OR PLEASURE IN DOING THINGS: NOT AT ALL
SUM OF ALL RESPONSES TO PHQ9 QUESTIONS 1 & 2: 0

## 2025-05-29 ASSESSMENT — LIFESTYLE VARIABLES
HOW MANY STANDARD DRINKS CONTAINING ALCOHOL DO YOU HAVE ON A TYPICAL DAY: PATIENT DOES NOT DRINK
AUDIT-C TOTAL SCORE: 0
HOW OFTEN DO YOU HAVE A DRINK CONTAINING ALCOHOL: NEVER
SKIP TO QUESTIONS 9-10: 1
HOW OFTEN DO YOU HAVE SIX OR MORE DRINKS ON ONE OCCASION: NEVER

## 2025-05-29 ASSESSMENT — PAIN SCALES - GENERAL: PAINLEVEL_OUTOF10: 0-NO PAIN

## 2025-05-29 NOTE — PATIENT INSTRUCTIONS
Here for preoperative evaluation for Right Knee arthroscopic surgery. Blood work is normal.  EKG is normal sinus rhythm.   Cleared by Dr. Blancas - cardiology.  Pt is cleared for surgery.      For wegovy - stop now and start 1 week after surgery.  Avoid ibuprofen/aleve 7 days prior.  Tylenol is safe to take.

## 2025-06-25 ENCOUNTER — SPECIALTY PHARMACY (OUTPATIENT)
Dept: PHARMACY | Facility: CLINIC | Age: 45
End: 2025-06-25

## 2025-06-25 PROCEDURE — RXMED WILLOW AMBULATORY MEDICATION CHARGE

## 2025-06-27 ENCOUNTER — PATIENT MESSAGE (OUTPATIENT)
Dept: PRIMARY CARE | Facility: CLINIC | Age: 45
End: 2025-06-27
Payer: COMMERCIAL

## 2025-06-27 DIAGNOSIS — E66.9 OBESITY (BMI 30-39.9): Primary | ICD-10-CM

## 2025-06-29 RX ORDER — SEMAGLUTIDE 1 MG/.5ML
1 INJECTION, SOLUTION SUBCUTANEOUS WEEKLY
Qty: 2 ML | Refills: 0 | Status: SHIPPED | OUTPATIENT
Start: 2025-06-29 | End: 2025-06-29

## 2025-06-29 RX ORDER — SEMAGLUTIDE 1 MG/.5ML
1 INJECTION, SOLUTION SUBCUTANEOUS WEEKLY
Qty: 2 ML | Refills: 0 | Status: SHIPPED | OUTPATIENT
Start: 2025-06-29 | End: 2025-07-28

## 2025-06-30 ENCOUNTER — PHARMACY VISIT (OUTPATIENT)
Dept: PHARMACY | Facility: CLINIC | Age: 45
End: 2025-06-30
Payer: COMMERCIAL

## 2025-06-30 PROCEDURE — RXMED WILLOW AMBULATORY MEDICATION CHARGE

## 2025-07-10 ENCOUNTER — PATIENT MESSAGE (OUTPATIENT)
Dept: PRIMARY CARE | Facility: CLINIC | Age: 45
End: 2025-07-10
Payer: COMMERCIAL

## 2025-07-10 DIAGNOSIS — R11.2 NAUSEA AND VOMITING, UNSPECIFIED VOMITING TYPE: ICD-10-CM

## 2025-07-10 RX ORDER — ONDANSETRON 4 MG/1
4 TABLET, ORALLY DISINTEGRATING ORAL EVERY 6 HOURS PRN
Qty: 30 TABLET | Refills: 5 | Status: SHIPPED | OUTPATIENT
Start: 2025-07-10

## 2025-07-14 ENCOUNTER — PATIENT MESSAGE (OUTPATIENT)
Dept: PRIMARY CARE | Facility: CLINIC | Age: 45
End: 2025-07-14
Payer: COMMERCIAL

## 2025-07-21 ENCOUNTER — PATIENT MESSAGE (OUTPATIENT)
Dept: PRIMARY CARE | Facility: CLINIC | Age: 45
End: 2025-07-21
Payer: COMMERCIAL

## 2025-07-21 DIAGNOSIS — E66.9 OBESITY (BMI 30-39.9): Primary | ICD-10-CM

## 2025-07-21 RX ORDER — SEMAGLUTIDE 1.7 MG/.75ML
1.7 INJECTION, SOLUTION SUBCUTANEOUS WEEKLY
Qty: 3 ML | Refills: 1 | Status: SHIPPED | OUTPATIENT
Start: 2025-07-21 | End: 2025-08-12

## 2025-07-22 DIAGNOSIS — G43.009 MIGRAINE WITHOUT AURA, NOT REFRACTORY: ICD-10-CM

## 2025-07-22 RX ORDER — RIZATRIPTAN BENZOATE 10 MG/1
10 TABLET ORAL ONCE AS NEEDED
Qty: 9 TABLET | Refills: 11 | Status: SHIPPED | OUTPATIENT
Start: 2025-07-22 | End: 2026-07-22

## 2025-07-28 ENCOUNTER — SPECIALTY PHARMACY (OUTPATIENT)
Dept: PHARMACY | Facility: CLINIC | Age: 45
End: 2025-07-28

## 2025-07-28 PROCEDURE — RXMED WILLOW AMBULATORY MEDICATION CHARGE

## 2025-08-01 ENCOUNTER — PHARMACY VISIT (OUTPATIENT)
Dept: PHARMACY | Facility: CLINIC | Age: 45
End: 2025-08-01
Payer: COMMERCIAL

## 2025-08-04 ENCOUNTER — PHARMACY VISIT (OUTPATIENT)
Dept: PHARMACY | Facility: CLINIC | Age: 45
End: 2025-08-04
Payer: COMMERCIAL

## 2025-08-20 ENCOUNTER — HOSPITAL ENCOUNTER (EMERGENCY)
Facility: HOSPITAL | Age: 45
Discharge: HOME | End: 2025-08-20
Attending: EMERGENCY MEDICINE
Payer: COMMERCIAL

## 2025-08-20 VITALS
OXYGEN SATURATION: 98 % | SYSTOLIC BLOOD PRESSURE: 145 MMHG | HEART RATE: 88 BPM | TEMPERATURE: 96.8 F | DIASTOLIC BLOOD PRESSURE: 93 MMHG | HEIGHT: 66 IN | BODY MASS INDEX: 28.93 KG/M2 | RESPIRATION RATE: 15 BRPM | WEIGHT: 180 LBS

## 2025-08-20 DIAGNOSIS — G43.809 OTHER MIGRAINE WITHOUT STATUS MIGRAINOSUS, NOT INTRACTABLE: Primary | ICD-10-CM

## 2025-08-20 PROCEDURE — 99284 EMERGENCY DEPT VISIT MOD MDM: CPT | Mod: 25 | Performed by: EMERGENCY MEDICINE

## 2025-08-20 PROCEDURE — 96374 THER/PROPH/DIAG INJ IV PUSH: CPT

## 2025-08-20 PROCEDURE — 2500000004 HC RX 250 GENERAL PHARMACY W/ HCPCS (ALT 636 FOR OP/ED): Performed by: EMERGENCY MEDICINE

## 2025-08-20 PROCEDURE — 96361 HYDRATE IV INFUSION ADD-ON: CPT

## 2025-08-20 PROCEDURE — 96375 TX/PRO/DX INJ NEW DRUG ADDON: CPT

## 2025-08-20 RX ORDER — FAMOTIDINE 10 MG/ML
40 INJECTION, SOLUTION INTRAVENOUS ONCE
Status: COMPLETED | OUTPATIENT
Start: 2025-08-20 | End: 2025-08-20

## 2025-08-20 RX ORDER — KETOROLAC TROMETHAMINE 15 MG/ML
15 INJECTION, SOLUTION INTRAMUSCULAR; INTRAVENOUS ONCE
Status: COMPLETED | OUTPATIENT
Start: 2025-08-20 | End: 2025-08-20

## 2025-08-20 RX ADMIN — SODIUM CHLORIDE 1000 ML: 0.9 INJECTION, SOLUTION INTRAVENOUS at 02:13

## 2025-08-20 RX ADMIN — KETOROLAC TROMETHAMINE 15 MG: 15 INJECTION, SOLUTION INTRAMUSCULAR; INTRAVENOUS at 02:13

## 2025-08-20 RX ADMIN — FAMOTIDINE 40 MG: 10 INJECTION, SOLUTION INTRAVENOUS at 02:18

## 2025-08-20 ASSESSMENT — PAIN DESCRIPTION - DESCRIPTORS
DESCRIPTORS: HEADACHE
DESCRIPTORS: PRESSURE;THROBBING

## 2025-08-20 ASSESSMENT — PAIN DESCRIPTION - PAIN TYPE
TYPE: ACUTE PAIN
TYPE: ACUTE PAIN

## 2025-08-20 ASSESSMENT — PAIN DESCRIPTION - PROGRESSION: CLINICAL_PROGRESSION: GRADUALLY IMPROVING

## 2025-08-20 ASSESSMENT — PAIN DESCRIPTION - FREQUENCY: FREQUENCY: CONSTANT/CONTINUOUS

## 2025-08-20 ASSESSMENT — LIFESTYLE VARIABLES
EVER FELT BAD OR GUILTY ABOUT YOUR DRINKING: NO
HAVE YOU EVER FELT YOU SHOULD CUT DOWN ON YOUR DRINKING: NO
HAVE PEOPLE ANNOYED YOU BY CRITICIZING YOUR DRINKING: NO
EVER HAD A DRINK FIRST THING IN THE MORNING TO STEADY YOUR NERVES TO GET RID OF A HANGOVER: NO
TOTAL SCORE: 0

## 2025-08-20 ASSESSMENT — PAIN SCALES - GENERAL
PAINLEVEL_OUTOF10: 2
PAINLEVEL_OUTOF10: 7

## 2025-08-20 ASSESSMENT — PAIN DESCRIPTION - LOCATION
LOCATION: HEAD
LOCATION: HEAD

## 2025-08-20 ASSESSMENT — PAIN DESCRIPTION - ONSET: ONSET: SUDDEN

## 2025-08-20 ASSESSMENT — PAIN - FUNCTIONAL ASSESSMENT
PAIN_FUNCTIONAL_ASSESSMENT: 0-10
PAIN_FUNCTIONAL_ASSESSMENT: 0-10

## 2025-08-31 ENCOUNTER — HOSPITAL ENCOUNTER (EMERGENCY)
Facility: HOSPITAL | Age: 45
Discharge: HOME | End: 2025-08-31
Attending: EMERGENCY MEDICINE
Payer: COMMERCIAL

## 2025-08-31 VITALS
WEIGHT: 180 LBS | HEART RATE: 87 BPM | OXYGEN SATURATION: 100 % | RESPIRATION RATE: 18 BRPM | TEMPERATURE: 97.5 F | SYSTOLIC BLOOD PRESSURE: 137 MMHG | DIASTOLIC BLOOD PRESSURE: 94 MMHG | BODY MASS INDEX: 28.93 KG/M2 | HEIGHT: 66 IN

## 2025-08-31 DIAGNOSIS — G43.909 MIGRAINE WITHOUT STATUS MIGRAINOSUS, NOT INTRACTABLE, UNSPECIFIED MIGRAINE TYPE: Primary | ICD-10-CM

## 2025-08-31 PROCEDURE — 2500000004 HC RX 250 GENERAL PHARMACY W/ HCPCS (ALT 636 FOR OP/ED): Mod: JZ | Performed by: EMERGENCY MEDICINE

## 2025-08-31 PROCEDURE — 96365 THER/PROPH/DIAG IV INF INIT: CPT

## 2025-08-31 PROCEDURE — 99284 EMERGENCY DEPT VISIT MOD MDM: CPT | Mod: 25 | Performed by: EMERGENCY MEDICINE

## 2025-08-31 PROCEDURE — 96375 TX/PRO/DX INJ NEW DRUG ADDON: CPT

## 2025-08-31 PROCEDURE — 96361 HYDRATE IV INFUSION ADD-ON: CPT

## 2025-08-31 RX ORDER — METOCLOPRAMIDE HYDROCHLORIDE 5 MG/ML
10 INJECTION INTRAMUSCULAR; INTRAVENOUS ONCE
Status: COMPLETED | OUTPATIENT
Start: 2025-08-31 | End: 2025-08-31

## 2025-08-31 RX ORDER — KETOROLAC TROMETHAMINE 15 MG/ML
15 INJECTION, SOLUTION INTRAMUSCULAR; INTRAVENOUS ONCE
Status: COMPLETED | OUTPATIENT
Start: 2025-08-31 | End: 2025-08-31

## 2025-08-31 RX ORDER — DIPHENHYDRAMINE HYDROCHLORIDE 50 MG/ML
25 INJECTION, SOLUTION INTRAMUSCULAR; INTRAVENOUS ONCE
Status: COMPLETED | OUTPATIENT
Start: 2025-08-31 | End: 2025-08-31

## 2025-08-31 RX ORDER — MAGNESIUM SULFATE 1 G/100ML
1 INJECTION INTRAVENOUS ONCE
Status: COMPLETED | OUTPATIENT
Start: 2025-08-31 | End: 2025-08-31

## 2025-08-31 RX ADMIN — KETOROLAC TROMETHAMINE 15 MG: 15 INJECTION, SOLUTION INTRAMUSCULAR; INTRAVENOUS at 07:57

## 2025-08-31 RX ADMIN — MAGNESIUM SULFATE IN DEXTROSE 1 G: 10 INJECTION, SOLUTION INTRAVENOUS at 09:55

## 2025-08-31 RX ADMIN — SODIUM CHLORIDE 1000 ML: 0.9 INJECTION, SOLUTION INTRAVENOUS at 07:54

## 2025-08-31 RX ADMIN — DIPHENHYDRAMINE HYDROCHLORIDE 25 MG: 50 INJECTION, SOLUTION INTRAMUSCULAR; INTRAVENOUS at 09:51

## 2025-08-31 RX ADMIN — METOCLOPRAMIDE 10 MG: 5 INJECTION, SOLUTION INTRAMUSCULAR; INTRAVENOUS at 09:48

## 2025-08-31 ASSESSMENT — PAIN - FUNCTIONAL ASSESSMENT
PAIN_FUNCTIONAL_ASSESSMENT: 0-10
PAIN_FUNCTIONAL_ASSESSMENT: 0-10

## 2025-08-31 ASSESSMENT — LIFESTYLE VARIABLES
TOTAL SCORE: 0
EVER HAD A DRINK FIRST THING IN THE MORNING TO STEADY YOUR NERVES TO GET RID OF A HANGOVER: NO
HAVE YOU EVER FELT YOU SHOULD CUT DOWN ON YOUR DRINKING: NO
HAVE PEOPLE ANNOYED YOU BY CRITICIZING YOUR DRINKING: NO
EVER FELT BAD OR GUILTY ABOUT YOUR DRINKING: NO

## 2025-08-31 ASSESSMENT — PAIN DESCRIPTION - FREQUENCY: FREQUENCY: CONSTANT/CONTINUOUS

## 2025-08-31 ASSESSMENT — PAIN DESCRIPTION - PAIN TYPE: TYPE: ACUTE PAIN

## 2025-08-31 ASSESSMENT — PAIN DESCRIPTION - PROGRESSION
CLINICAL_PROGRESSION: GRADUALLY IMPROVING
CLINICAL_PROGRESSION: GRADUALLY WORSENING

## 2025-08-31 ASSESSMENT — PAIN DESCRIPTION - ONSET: ONSET: ONGOING

## 2025-08-31 ASSESSMENT — PAIN DESCRIPTION - DESCRIPTORS: DESCRIPTORS: ACHING;HEADACHE

## 2025-08-31 ASSESSMENT — PAIN SCALES - GENERAL
PAINLEVEL_OUTOF10: 7
PAINLEVEL_OUTOF10: 8

## 2025-08-31 ASSESSMENT — PAIN DESCRIPTION - LOCATION: LOCATION: HEAD

## 2025-09-04 ENCOUNTER — TELEMEDICINE (OUTPATIENT)
Dept: NEUROLOGY | Facility: HOSPITAL | Age: 45
End: 2025-09-04
Payer: COMMERCIAL

## 2025-09-04 DIAGNOSIS — G43.009 MIGRAINE WITHOUT AURA, NOT REFRACTORY: ICD-10-CM

## 2025-09-04 PROCEDURE — 1036F TOBACCO NON-USER: CPT | Performed by: PSYCHIATRY & NEUROLOGY

## 2025-09-04 PROCEDURE — 99215 OFFICE O/P EST HI 40 MIN: CPT | Performed by: PSYCHIATRY & NEUROLOGY

## 2025-09-04 RX ORDER — RIMEGEPANT SULFATE 75 MG/75MG
75 TABLET, ORALLY DISINTEGRATING ORAL DAILY PRN
Qty: 8 TABLET | Refills: 11 | Status: SHIPPED | OUTPATIENT
Start: 2025-09-04 | End: 2026-09-04

## 2025-09-04 RX ORDER — METHYLPREDNISOLONE 4 MG/1
TABLET ORAL
Qty: 21 TABLET | Refills: 0 | Status: SHIPPED | OUTPATIENT
Start: 2025-09-04

## 2025-09-30 ENCOUNTER — APPOINTMENT (OUTPATIENT)
Dept: OBSTETRICS AND GYNECOLOGY | Facility: CLINIC | Age: 45
End: 2025-09-30
Payer: COMMERCIAL